# Patient Record
Sex: FEMALE | Race: WHITE | NOT HISPANIC OR LATINO | Employment: OTHER | URBAN - METROPOLITAN AREA
[De-identification: names, ages, dates, MRNs, and addresses within clinical notes are randomized per-mention and may not be internally consistent; named-entity substitution may affect disease eponyms.]

---

## 2017-04-04 ENCOUNTER — RECORDS - HEALTHEAST (OUTPATIENT)
Dept: LAB | Facility: CLINIC | Age: 77
End: 2017-04-04

## 2017-04-04 LAB
CHOLEST SERPL-MCNC: 173 MG/DL
FASTING STATUS PATIENT QL REPORTED: ABNORMAL
HBA1C MFR BLD: 6.6 % (ref 4.2–6.1)
HDLC SERPL-MCNC: 30 MG/DL
LDLC SERPL CALC-MCNC: 93 MG/DL
TRIGL SERPL-MCNC: 250 MG/DL

## 2017-11-07 NOTE — TELEPHONE ENCOUNTER
Phone Call:    Who did you talk to? (or) Who did you call? Nela    Call Detail/Action: Per Nela - Right knee surgery was done at [facility]. Records are with [facility]. Has only had one surgery to right knee.     Will fax the JOHANNE to Nela to sign. Fax#[phone]

## 2017-11-07 NOTE — TELEPHONE ENCOUNTER
APPT INFO    Date /Time: 11/15/17- 1:30 PM    Reason for Appt: Patellar tendon rupture after R TKA done in 2007    Ref Provider/Clinic: Dr. Carlos Ferreira    Are there internal records? If yes, list: -[facility] - Op note left knee arthroplasty with Dr. Carlos Ferreira 6/7/10     Patient Contact (Y/N) & Call Details: Yes    Action: LM for patient to call back.   Where did she see Dr. Orlin Uribe for right knee surgery?     -Faxed [facility] for records.      OUTSIDE RECORDS CHECKLIST     CLINIC NAME COMMENTS REC (x) IMG (x)   [facility]   X X   [facility]  X N/A  Images from 2006

## 2017-11-08 NOTE — TELEPHONE ENCOUNTER
Received Imaging From: [facility]    Image Type (x): Disc:_____  Pacs:__X___      Exam Date/Name: XR Knee 9/20/11, 9/5/17 Comments:   E-mailed Rufina to verona

## 2017-11-08 NOTE — TELEPHONE ENCOUNTER
Records Received From: [facility]     Date/Exam/Location  (specify location if different)   Office Notes: 9/5/17   Radiology Reports: XR Right and Left Knee: 9/5/17  Rufina Bell Notified (Y/N):

## 2017-11-10 ENCOUNTER — RECORDS - HEALTHEAST (OUTPATIENT)
Dept: LAB | Facility: CLINIC | Age: 77
End: 2017-11-10

## 2017-11-13 DIAGNOSIS — M25.561 RIGHT KNEE PAIN: Primary | ICD-10-CM

## 2017-11-13 LAB — HBA1C MFR BLD: 7.3 % (ref 4.2–6.1)

## 2017-11-13 NOTE — TELEPHONE ENCOUNTER
Records Received From: [facility]     Date/Exam/Location  (specify location if different)   Office Notes: 2006   ED/Hosp Notes: Discharge 6/12/06    Radiology Reports: XR Left Knee: 6/5/06  XR Right Knee: 6/5/06, 6/8/06, 7/27/06   Rufina Bell Notified (Y/N): No    Operative Notes & Implant Records: 6/8/06 - Right Total Knee Arthroplasty (Dr. Orlin Uribe)

## 2017-11-15 ENCOUNTER — MEDICAL CORRESPONDENCE (OUTPATIENT)
Dept: HEALTH INFORMATION MANAGEMENT | Facility: CLINIC | Age: 77
End: 2017-11-15

## 2017-11-15 ENCOUNTER — OFFICE VISIT (OUTPATIENT)
Dept: ORTHOPEDICS | Facility: CLINIC | Age: 77
End: 2017-11-15

## 2017-11-15 ENCOUNTER — PRE VISIT (OUTPATIENT)
Dept: ORTHOPEDICS | Facility: CLINIC | Age: 77
End: 2017-11-15

## 2017-11-15 DIAGNOSIS — Z96.651 STATUS POST TOTAL RIGHT KNEE REPLACEMENT: Primary | ICD-10-CM

## 2017-11-15 DIAGNOSIS — Z96.651 STATUS POST TOTAL RIGHT KNEE REPLACEMENT: ICD-10-CM

## 2017-11-15 LAB
CRP SERPL-MCNC: 9.6 MG/L (ref 0–8)
ERYTHROCYTE [SEDIMENTATION RATE] IN BLOOD BY WESTERGREN METHOD: 51 MM/H (ref 0–30)
HBA1C MFR BLD: 7.2 % (ref 4.3–6)

## 2017-11-15 RX ORDER — CETIRIZINE HYDROCHLORIDE 10 MG/1
10 TABLET ORAL DAILY
COMMUNITY
End: 2023-08-27

## 2017-11-15 RX ORDER — LISINOPRIL 2.5 MG/1
2.5 TABLET ORAL
COMMUNITY
Start: 2015-08-19 | End: 2023-08-27

## 2017-11-15 RX ORDER — GLIPIZIDE 10 MG/1
10 TABLET ORAL
COMMUNITY
Start: 2015-08-19 | End: 2023-08-27

## 2017-11-15 RX ORDER — FLUTICASONE PROPIONATE 50 MCG
1 SPRAY, SUSPENSION (ML) NASAL 2 TIMES DAILY
COMMUNITY

## 2017-11-15 NOTE — MR AVS SNAPSHOT
After Visit Summary   11/15/2017    Nela Suazo    MRN: [id number]           Patient Information     Date Of Birth          1940        Visit Information        Provider Department      11/15/2017 1:30 PM Case Wilkinson MD [facility]        Today's Diagnoses     Status post total right knee replacement    -  1       Follow-ups after your visit        Your next 10 appointments already scheduled     Dec 20, 2017  5:45 PM CST   (Arrive by 5:30 PM)   RETURN KNEE with Case Wilkinson MD   [facility] ([facility])    [address] [place] [id number] [phone]              Who to contact     Please call your clinic at [phone] to:    Ask questions about your health    Make or cancel appointments    Discuss your medicines    Learn about your test results    Speak to your doctor   If you have compliments or concerns about an experience at your clinic, or if you wish to file a complaint, please contact [facility] Patient Relations at [phone] or email us at [email or web address]         Additional Information About Your Visit        MyChart Information     [employer] is an electronic gateway that provides easy, online access to your medical records. With [employer], you can request a clinic appointment, read your test results, renew a prescription or communicate with your care team.     To sign up for [employer] visit the website at [email or web address]   You will be asked to enter the access code listed below, as well as some personal information. Please follow the directions to create your username and password.     Your access code is: [email or web address]  Expires: 2018  5:30 AM     Your access code will  in 90 days. If you need help or a new code, please contact your [facility] or call [phone] for assistance.         Care EveryWhere ID     This is your Care EveryWhere ID. This could be used by other organizations to access your [facility] medical records  [id number]         Blood Pressure from Last 3 Encounters:   11/08/14 123/58   11/22/10 132/84    Weight from Last 3 Encounters:   11/07/14 125.4 kg (276 lb 7.3 oz)   11/22/10 112.9 kg (249 lb)               Primary Care Provider Office Phone # Fax #    Horacio Lazaro [phone]       [facility] [address]  [facility] [address]        Equal Access to Services     JONNIE [facility] : Hadii aad ku hadasho Soomaali, waaxda luqadaha, qaybta kaalmada adeegyada, eugenia bernabe haykevinn adedi jean baptiste . So [facility] [phone].    ATENCIÓN: Si habla español, tiene a lee disposición servicios gratuitos de asistencia lingüística. Llame al [phone].    We comply with applicable federal civil rights laws and [place] laws. We do not discriminate on the basis of race, color, national origin, age, disability, sex, sexual orientation, or gender identity.            Thank you!     Thank you for choosing [facility]  for your care. Our goal is always to provide you with excellent care. Hearing back from our patients is one way we can continue to improve our services. Please take a few minutes to complete the written survey that you may receive in the mail after your visit with us. Thank you!             Your Updated Medication List - Protect others around you: Learn how to safely use, store and throw away your medicines at www.disposemymeds.org.          This list is accurate as of: 11/15/17  4:37 PM.  Always use your most recent med list.                   Brand Name Dispense Instructions for use Diagnosis    ABILIFY 30 MG tablet   Generic drug:  ARIPiprazole      Take 30 mg by mouth daily.        ACETAMINOPHEN PO      Take 1,000 mg by mouth 3 times daily        albuterol (2.5 MG/3ML) 0.083% neb solution      Take 1 ampule by  nebulization every 4 hours as needed        alum & mag hydroxide-simethicone 200-200-20 MG/5ML Susp suspension    MYLANTA/MAALOX     Take 30 mLs by mouth every 4 hours as needed (gastric distress)        AMOXICILLIN PO      Take 2,000 g by mouth as needed. 2000 Grams before dental procedures        ASPIRIN EC LO-DOSE 81 MG EC tablet   Generic drug:  aspirin      Take 81 mg by mouth every other day        Calcium + D3 600-200 MG-UNIT Tabs      Take 1 tablet by mouth 2 times daily        capsaicin 0.025 % Crea cream    ZOSTRIX     Apply 1 applicator topically 4 times daily Apply to left shoulder and lower back/spine.        cetirizine 10 MG tablet    zyrTEC     Take 10 mg by mouth daily        * divalproex 250 MG 24 hr tablet    DEPAKOTE ER     Take 250 mg by mouth At Bedtime Give in addition to 1000mg dose at bedtime. Total daily = 1250mg.        * divalproex 500 MG 24 hr tablet    DEPAKOTE ER     Take 1,000 mg by mouth At Bedtime Give with 250mg at bedtime        fexofenadine 60 MG tablet    ALLEGRA     Take 60 mg by mouth daily        FLONASE ALLERGY RELIEF 50 MCG/ACT spray   Generic drug:  fluticasone      Spray 1 spray into both nostrils daily        glipiZIDE 10 MG tablet    GLUCOTROL     Take 10 mg by mouth        guaiFENesin 100 MG/5ML Syrp    ROBITUSSIN     Take 5 mLs by mouth every 4 hours as needed for cough        hydrochlorothiazide 12.5 MG Tabs tablet      Take 12.5 mg by mouth daily        IMODIUM A-D 2 MG tablet   Generic drug:  loperamide      Take 2 mg by mouth 4 times daily as needed (up to 8mg/24 hours)        INDERAL 60 MG Tabs   Generic drug:  propranolol HCl      Take 60 mg by mouth 3 times daily        ipratropium 0.02 % neb solution    ATROVENT     Take 0.5 mg by nebulization every 4 hours as needed (Obstructive chronic airway)        lisinopril 2.5 MG tablet    PRINIVIL/Zestril     Take 2.5 mg by mouth        MILK OF MAGNESIA PO           nystatin 054912 UNIT/GM Powd    MYCOSTATIN     Apply  topically 2 times daily 1 application twice dailt to reddened areas of skin for rash.        omeprazole 20 MG tablet      Take 40 mg by mouth daily        order for DME      2 L.        OYST-MAIA 1250 MG tablet   Generic drug:  calcium carbonate      Take 2 tablets by mouth 3 times daily After meal        PENLAC EX           * polyvinyl alcohol 1.4 % ophthalmic solution    LIQUIFILM TEARS     1 drop 4 times daily        * polyvinyl alcohol 1.4 % ophthalmic solution    LIQUIFILM TEARS     Place 1 drop into both eyes every 2 hours as needed (May keep in room ans self administer)        potassium chloride 20 MEQ Packet    KLOR-CON     Take 20 mEq by mouth daily        ROBITUSSIN DM PO      Take  by mouth every 4 hours as needed.        sennosides 8.6 MG tablet    SENOKOT     Take 3 tablets by mouth At Bedtime        SIMETHICONE-80 PO      Take 80 mg by mouth 2 times daily After lunch and supper        SYMBICORT 80-4.5 MCG/ACT Inhaler   Generic drug:  budesonide-formoterol      Inhale 2 puffs into the lungs 2 times daily.        TUMS PO      Take 1 chew tab by mouth 4 times daily as needed        vitamin D3 1000 UNITS Caps      Take 1,000 Units by mouth        ZOCOR 20 MG tablet   Generic drug:  simvastatin      Take 20 mg by mouth At Bedtime.        ZOLOFT 100 MG tablet   Generic drug:  sertraline      Take 250 mg by mouth daily        * Notice:  This list has 4 medication(s) that are the same as other medications prescribed for you. Read the directions carefully, and ask your doctor or other care provider to review them with you.

## 2017-11-15 NOTE — LETTER
11/15/2017       RE: Nela Suazo  [id number]   [facility] [address]     Dear Colleague,    Thank you for referring your patient, Nela Suazo, to the [facility] at [facility]. Please see a copy of my visit note below.    [facility], Orthopaedic Surgery, Arthritis, Hip and Knee Replacement    Nela Suazo MRN# [id number]   Age: 77 year old YOB: 1940     Requesting physician: Carlos Ferreira              History of Present Illness:   Nela Suazo is a 77 year old year old female who presents today for evaluation and management of right knee extensor mechanism injury.    Nela is a very pleasant 77-year-old woman who had a right total knee replacement done about 11 years ago. Her postoperative course was complicated by an extensor mechanism rupture. She had subsequently seen a few orthopedic surgeons over the past few years who recommended referral to the [facility] or [facility] for further management. She never sought those appointments up until now. She has been very frustrated over the past year or so due to inability to walk and fully extend her knee.    She has been living in a rehabilitation center and has not been putting any weight on the leg. She had previously tried a knee brace for walking, however she is greatly bothered by the knee brace. She has normal extensor leg and every time she tries to walk, the knee asha.  She has no history of infection or problems with incisional healing.    Lost 33 pounds over the past 2 years through dietary management.           Past Medical History:     Patient Active Problem List   Diagnosis     Sepsis (H)     Past Medical History:   Diagnosis Date     Anemia      Bipolar 1 disorder (H)      Constipation      COPD (chronic obstructive pulmonary disease) (H)      Cough      Depressive disorder      Hyperlipidaemia hypopotass     Hypertension       Hypopotassemia      Insomnia      MRSA infection      Osteoporosis      Overweight      Polyneuritis      PVD (peripheral vascular disease) (H)      Reflux      Schizo affective schizophrenia (H)      Sleep apnea      Tremor      Uncomplicated asthma        Bipolar - recently saw psychiatrist who said she is optimized and acceptable risk for surgery from psych standpoint.    Prior history of blood clot: none  Prior history of bleeding problems: none  Prior history of anesthetic complications: none  Currently on opioids:  none  History of Diabetes: no           Past Surgical History:   No past surgical history on file.         Social History:     Social History     Social History     Marital status: Single     Spouse name: N/A     Number of children: N/A     Years of education: N/A     Occupational History     Not on file.     Social History Main Topics     Smoking status: Never Smoker     Smokeless tobacco: Not on file     Alcohol use Not on file     Drug use: Not on file     Sexual activity: Not on file     Other Topics Concern     Not on file     Social History Narrative       She lives in [facility]   Non smoker           Family History:     No family history on file.    No family hx of blood clots.         Medications:     Current Outpatient Prescriptions   Medication Sig     Cholecalciferol (VITAMIN D3) 1000 UNITS CAPS Take 1,000 Units by mouth     glipiZIDE (GLUCOTROL) 10 MG tablet Take 10 mg by mouth     lisinopril (PRINIVIL/ZESTRIL) 2.5 MG tablet Take 2.5 mg by mouth     Ciclopirox (PENLAC EX)      cetirizine (ZYRTEC) 10 MG tablet Take 10 mg by mouth daily     Magnesium Hydroxide (MILK OF MAGNESIA PO)      fluticasone (FLONASE ALLERGY RELIEF) 50 MCG/ACT spray Spray 1 spray into both nostrils daily     divalproex (DEPAKOTE ER) 250 MG 24 hr tablet Take 250 mg by mouth At Bedtime Give in addition to 1000mg dose at bedtime. Total daily = 1250mg.     polyvinyl alcohol (LIQUIFILM TEARS) 1.4 % ophthalmic solution 1  drop 4 times daily     polyvinyl alcohol (LIQUIFILM TEARS) 1.4 % ophthalmic solution Place 1 drop into both eyes every 2 hours as needed (May keep in room ans self administer)     nystatin (MYCOSTATIN) 252028 UNIT/GM POWD Apply topically 2 times daily 1 application twice dailt to reddened areas of skin for rash.     sennosides (SENOKOT) 8.6 MG tablet Take 3 tablets by mouth At Bedtime     SIMETHICONE-80 PO Take 80 mg by mouth 2 times daily After lunch and supper     ACETAMINOPHEN PO Take 1,000 mg by mouth 3 times daily     divalproex (DEPAKOTE ER) 500 MG 24 hr tablet Take 1,000 mg by mouth At Bedtime Give with 250mg at bedtime     ARIPiprazole (ABILIFY) 30 MG tablet Take 30 mg by mouth daily.     hydrochlorothiazide 12.5 MG TABS Take 12.5 mg by mouth daily      Propranolol HCl (INDERAL) 60 MG TABS Take 60 mg by mouth 3 times daily      budesonide-formoterol (SYMBICORT) 80-4.5 MCG/ACT inhaler Inhale 2 puffs into the lungs 2 times daily.     simvastatin (ZOCOR) 20 MG tablet Take 20 mg by mouth At Bedtime.     sertraline (ZOLOFT) 100 MG tablet Take 250 mg by mouth daily      aspirin (ASPIRIN EC LO-DOSE) 81 MG EC tablet Take 81 mg by mouth every other day      Omeprazole 20 MG tablet Take 40 mg by mouth daily      Calcium Carb-Cholecalciferol (CALCIUM + D3) 600-200 MG-UNIT TABS Take 1 tablet by mouth 2 times daily     capsaicin (ZOSTRIX) 0.025 % CREA Apply 1 applicator topically 4 times daily Apply to left shoulder and lower back/spine.     fexofenadine (ALLEGRA) 60 MG tablet Take 60 mg by mouth daily     alum & mag hydroxide-simethicone (MYLANTA/MAALOX) 200-200-20 MG/5ML SUSP Take 30 mLs by mouth every 4 hours as needed (gastric distress)     guaiFENesin (ROBITUSSIN) 100 MG/5ML SYRP Take 5 mLs by mouth every 4 hours as needed for cough     ipratropium (ATROVENT) 0.02 % nebulizer solution Take 0.5 mg by nebulization every 4 hours as needed (Obstructive chronic airway)     albuterol (2.5 MG/3ML) 0.083% nebulizer solution  Take 1 ampule by nebulization every 4 hours as needed      AMOXICILLIN PO Take 2,000 g by mouth as needed. 2000 Grams before dental procedures     calcium carbonate (OYST-MAIA) 500 MG tablet Take 2 tablets by mouth 3 times daily After meal     potassium chloride (KLOR-CON) 20 MEQ packet Take 20 mEq by mouth daily      Dextromethorphan-Guaifenesin (ROBITUSSIN DM PO) Take  by mouth every 4 hours as needed.     ORDER FOR DME 2 L.     loperamide (IMODIUM A-D) 2 MG tablet Take 2 mg by mouth 4 times daily as needed (up to 8mg/24 hours)      Calcium Carbonate Antacid (TUMS PO) Take 1 chew tab by mouth 4 times daily as needed      No current facility-administered medications for this visit.             Review of Systems:   A comprehensive 10 point review of systems (constitutional, ENT, cardiac, peripheral vascular, lymphatic, respiratory, GI, , Musculoskeletal, skin, Neurological) was performed and found to be negative except as described in this note.     Also see intake form completed by patient.             Physical Exam:     EXAMINATION pertinent findings:   VITAL SIGNS: There were no vitals taken for this visit.  There is no height or weight on file to calculate BMI.  GEN: AOx3, cooperative, no distress  RESP: non labored breathing   ABD: benign   SKIN: grossly normal   LYMPHATIC: grossly normal   NEURO: grossly normal   VASCULAR: satisfactory perfusion of all extremities  MUSCULOSKELETAL:   She has a well-healed midline incision. Her passive range of motion is from 0 to about 115 . She has mild lateral instability, but with a firm endpoint. She has a 40  extensor leg. Again, passively she has full extension. Ankle plantarflexion and dorsiflexion is intact. She has no numbness or tingling in her foot.             Data:   Imaging:   Plain films of the right knee reviewed which demonstrate a cemented cruciate retaining total knee replacement. The patella is proximal consistent with her patellar tendon rupture.          Assessment and Plan:   Assessment:  Nela is a very pleasant 77-year-old woman with a chronic patellar tendon rupture.  Discussion with her regarding surgical and nonsurgical treatment options. We discussed that any operative treatment at this point would be associated with significant risks including the risk of infection, rerupture, persistent pain, persistent difficulty with knee buckling. We discussed that the recovery process is also extremely difficult and would be more difficult given the weakness she has our left lower extremity and inability to walk even with a walker. At this point, I recommended she consider the discussion we had today. We'll obtain inflammatory markers today. We'll also obtain hemoglobin A1c. I'll see her back in clinic in 4-6 weeks and we will again revisit the procedure as well as other treatment options. She is agreement with this treatment plan and I will see her in a few weeks.    ADDENDUM: Inflammatory markers were elevated so will plan to obtain aspiration at her next visit.      Case Wilknison M.D.     Arthritis and Joint Replacement  Department of Orthopaedic Surgery, [facility]  [email or web address]  [phone] (pager)

## 2017-11-15 NOTE — PROGRESS NOTES
[facility], Orthopaedic Surgery, Arthritis, Hip and Knee Replacement    Nela Suazo MRN# [id number]   Age: 77 year old YOB: 1940     Requesting physician: Carlos Ferreira              History of Present Illness:   Nela Suazo is a 77 year old year old female who presents today for evaluation and management of right knee extensor mechanism injury.    Nela is a very pleasant 77-year-old woman who had a right total knee replacement done about 11 years ago. Her postoperative course was complicated by an extensor mechanism rupture. She had subsequently seen a few orthopedic surgeons over the past few years who recommended referral to the [place] or [place] for further management. She never sought those appointments up until now. She has been very frustrated over the past year or so due to inability to walk and fully extend her knee.    She has been living in a rehabilitation center and has not been putting any weight on the leg. She had previously tried a knee brace for walking, however she is greatly bothered by the knee brace. She has normal extensor leg and every time she tries to walk, the knee asha.  She has no history of infection or problems with incisional healing.    Lost 33 pounds over the past 2 years through dietary management.           Past Medical History:     Patient Active Problem List   Diagnosis     Sepsis (H)     Past Medical History:   Diagnosis Date     Anemia      Bipolar 1 disorder (H)      Constipation      COPD (chronic obstructive pulmonary disease) (H)      Cough      Depressive disorder      Hyperlipidaemia hypopotass     Hypertension      Hypopotassemia      Insomnia      MRSA infection      Osteoporosis      Overweight      Polyneuritis      PVD (peripheral vascular disease) (H)      Reflux      Schizo affective schizophrenia (H)      Sleep apnea      Tremor      Uncomplicated asthma        Bipolar - recently saw psychiatrist who said she is  optimized and acceptable risk for surgery from psych standpoint.    Prior history of blood clot: none  Prior history of bleeding problems: none  Prior history of anesthetic complications: none  Currently on opioids:  none  History of Diabetes: no           Past Surgical History:   No past surgical history on file.         Social History:     Social History     Social History     Marital status: Single     Spouse name: N/A     Number of children: N/A     Years of education: N/A     Occupational History     Not on file.     Social History Main Topics     Smoking status: Never Smoker     Smokeless tobacco: Not on file     Alcohol use Not on file     Drug use: Not on file     Sexual activity: Not on file     Other Topics Concern     Not on file     Social History Narrative       She lives in [facility]   Non smoker           Family History:     No family history on file.    No family hx of blood clots.         Medications:     Current Outpatient Prescriptions   Medication Sig     Cholecalciferol (VITAMIN D3) 1000 UNITS CAPS Take 1,000 Units by mouth     glipiZIDE (GLUCOTROL) 10 MG tablet Take 10 mg by mouth     lisinopril (PRINIVIL/ZESTRIL) 2.5 MG tablet Take 2.5 mg by mouth     Ciclopirox (PENLAC EX)      cetirizine (ZYRTEC) 10 MG tablet Take 10 mg by mouth daily     Magnesium Hydroxide (MILK OF MAGNESIA PO)      fluticasone (FLONASE ALLERGY RELIEF) 50 MCG/ACT spray Spray 1 spray into both nostrils daily     divalproex (DEPAKOTE ER) 250 MG 24 hr tablet Take 250 mg by mouth At Bedtime Give in addition to 1000mg dose at bedtime. Total daily = 1250mg.     polyvinyl alcohol (LIQUIFILM TEARS) 1.4 % ophthalmic solution 1 drop 4 times daily     polyvinyl alcohol (LIQUIFILM TEARS) 1.4 % ophthalmic solution Place 1 drop into both eyes every 2 hours as needed (May keep in room ans self administer)     nystatin (MYCOSTATIN) 684214 UNIT/GM POWD Apply topically 2 times daily 1 application twice dailt to reddened areas of skin for  rash.     sennosides (SENOKOT) 8.6 MG tablet Take 3 tablets by mouth At Bedtime     SIMETHICONE-80 PO Take 80 mg by mouth 2 times daily After lunch and supper     ACETAMINOPHEN PO Take 1,000 mg by mouth 3 times daily     divalproex (DEPAKOTE ER) 500 MG 24 hr tablet Take 1,000 mg by mouth At Bedtime Give with 250mg at bedtime     ARIPiprazole (ABILIFY) 30 MG tablet Take 30 mg by mouth daily.     hydrochlorothiazide 12.5 MG TABS Take 12.5 mg by mouth daily      Propranolol HCl (INDERAL) 60 MG TABS Take 60 mg by mouth 3 times daily      budesonide-formoterol (SYMBICORT) 80-4.5 MCG/ACT inhaler Inhale 2 puffs into the lungs 2 times daily.     simvastatin (ZOCOR) 20 MG tablet Take 20 mg by mouth At Bedtime.     sertraline (ZOLOFT) 100 MG tablet Take 250 mg by mouth daily      aspirin (ASPIRIN EC LO-DOSE) 81 MG EC tablet Take 81 mg by mouth every other day      Omeprazole 20 MG tablet Take 40 mg by mouth daily      Calcium Carb-Cholecalciferol (CALCIUM + D3) 600-200 MG-UNIT TABS Take 1 tablet by mouth 2 times daily     capsaicin (ZOSTRIX) 0.025 % CREA Apply 1 applicator topically 4 times daily Apply to left shoulder and lower back/spine.     fexofenadine (ALLEGRA) 60 MG tablet Take 60 mg by mouth daily     alum & mag hydroxide-simethicone (MYLANTA/MAALOX) 200-200-20 MG/5ML SUSP Take 30 mLs by mouth every 4 hours as needed (gastric distress)     guaiFENesin (ROBITUSSIN) 100 MG/5ML SYRP Take 5 mLs by mouth every 4 hours as needed for cough     ipratropium (ATROVENT) 0.02 % nebulizer solution Take 0.5 mg by nebulization every 4 hours as needed (Obstructive chronic airway)     albuterol (2.5 MG/3ML) 0.083% nebulizer solution Take 1 ampule by nebulization every 4 hours as needed      AMOXICILLIN PO Take 2,000 g by mouth as needed. 2000 Grams before dental procedures     calcium carbonate (OYST-MAIA) 500 MG tablet Take 2 tablets by mouth 3 times daily After meal     potassium chloride (KLOR-CON) 20 MEQ packet Take 20 mEq by mouth  daily      Dextromethorphan-Guaifenesin (ROBITUSSIN DM PO) Take  by mouth every 4 hours as needed.     ORDER FOR DME 2 L.     loperamide (IMODIUM A-D) 2 MG tablet Take 2 mg by mouth 4 times daily as needed (up to 8mg/24 hours)      Calcium Carbonate Antacid (TUMS PO) Take 1 chew tab by mouth 4 times daily as needed      No current facility-administered medications for this visit.             Review of Systems:   A comprehensive 10 point review of systems (constitutional, ENT, cardiac, peripheral vascular, lymphatic, respiratory, GI, , Musculoskeletal, skin, Neurological) was performed and found to be negative except as described in this note.     Also see intake form completed by patient.             Physical Exam:     EXAMINATION pertinent findings:   VITAL SIGNS: There were no vitals taken for this visit.  There is no height or weight on file to calculate BMI.  GEN: AOx3, cooperative, no distress  RESP: non labored breathing   ABD: benign   SKIN: grossly normal   LYMPHATIC: grossly normal   NEURO: grossly normal   VASCULAR: satisfactory perfusion of all extremities  MUSCULOSKELETAL:   She has a well-healed midline incision. Her passive range of motion is from 0 to about 115 . She has mild lateral instability, but with a firm endpoint. She has a 40  extensor leg. Again, passively she has full extension. Ankle plantarflexion and dorsiflexion is intact. She has no numbness or tingling in her foot.             Data:   Imaging:   Plain films of the right knee reviewed which demonstrate a cemented cruciate retaining total knee replacement. The patella is proximal consistent with her patellar tendon rupture.         Assessment and Plan:   Assessment:  Nela is a very pleasant 77-year-old woman with a chronic patellar tendon rupture.  Discussion with her regarding surgical and nonsurgical treatment options. We discussed that any operative treatment at this point would be associated with significant risks including the  risk of infection, rerupture, persistent pain, persistent difficulty with knee buckling. We discussed that the recovery process is also extremely difficult and would be more difficult given the weakness she has our left lower extremity and inability to walk even with a walker. At this point, I recommended she consider the discussion we had today. We'll obtain inflammatory markers today. We'll also obtain hemoglobin A1c. I'll see her back in clinic in 4-6 weeks and we will again revisit the procedure as well as other treatment options. She is agreement with this treatment plan and I will see her in a few weeks.    ADDENDUM: Inflammatory markers were elevated so will plan to obtain aspiration at her next visit.      Case Wilkinson M.D.     Arthritis and Joint Replacement  Department of Orthopaedic Surgery, [facility]  [email or web address]  [phone] (pager)           Review of Systems:

## 2017-11-15 NOTE — NURSING NOTE
Reason For Visit:   Chief Complaint   Patient presents with     Knee Pain     Patellar tendon rupture after total knee in 2007 by Dr. Jojo Dumont MD: Horacio Lazaro        Current Outpatient Prescriptions   Medication     Cholecalciferol (VITAMIN D3) 1000 UNITS CAPS     glipiZIDE (GLUCOTROL) 10 MG tablet     lisinopril (PRINIVIL/ZESTRIL) 2.5 MG tablet     Ciclopirox (PENLAC EX)     cetirizine (ZYRTEC) 10 MG tablet     Magnesium Hydroxide (MILK OF MAGNESIA PO)     fluticasone (FLONASE ALLERGY RELIEF) 50 MCG/ACT spray     divalproex (DEPAKOTE ER) 250 MG 24 hr tablet     polyvinyl alcohol (LIQUIFILM TEARS) 1.4 % ophthalmic solution     polyvinyl alcohol (LIQUIFILM TEARS) 1.4 % ophthalmic solution     nystatin (MYCOSTATIN) 453634 UNIT/GM POWD     sennosides (SENOKOT) 8.6 MG tablet     SIMETHICONE-80 PO     ACETAMINOPHEN PO     divalproex (DEPAKOTE ER) 500 MG 24 hr tablet     ARIPiprazole (ABILIFY) 30 MG tablet     hydrochlorothiazide 12.5 MG TABS     Propranolol HCl (INDERAL) 60 MG TABS     budesonide-formoterol (SYMBICORT) 80-4.5 MCG/ACT inhaler     simvastatin (ZOCOR) 20 MG tablet     sertraline (ZOLOFT) 100 MG tablet     aspirin (ASPIRIN EC LO-DOSE) 81 MG EC tablet     Omeprazole 20 MG tablet     Calcium Carb-Cholecalciferol (CALCIUM + D3) 600-200 MG-UNIT TABS     capsaicin (ZOSTRIX) 0.025 % CREA     fexofenadine (ALLEGRA) 60 MG tablet     alum & mag hydroxide-simethicone (MYLANTA/MAALOX) 200-200-20 MG/5ML SUSP     guaiFENesin (ROBITUSSIN) 100 MG/5ML SYRP     ipratropium (ATROVENT) 0.02 % nebulizer solution     albuterol (2.5 MG/3ML) 0.083% nebulizer solution     AMOXICILLIN PO     calcium carbonate (OYST-MAIA) 500 MG tablet     potassium chloride (KLOR-CON) 20 MEQ packet     Dextromethorphan-Guaifenesin (ROBITUSSIN DM PO)     ORDER FOR DME     loperamide (IMODIUM A-D) 2 MG tablet     Calcium Carbonate Antacid (TUMS PO)     No current facility-administered medications for this visit.           Allergies    Allergen Reactions     Ace Inhibitors      Codeine Sulfate      Influenza Vac Typ      Oxycodone            Joann Wagoner, JAMIEN

## 2017-12-20 ENCOUNTER — OFFICE VISIT (OUTPATIENT)
Dept: ORTHOPEDICS | Facility: CLINIC | Age: 77
End: 2017-12-20
Payer: COMMERCIAL

## 2017-12-20 DIAGNOSIS — Z96.651 STATUS POST TOTAL RIGHT KNEE REPLACEMENT: Primary | ICD-10-CM

## 2017-12-20 LAB
APPEARANCE FLD: NORMAL
BASOPHILS NFR FLD MANUAL: 1 %
COLOR FLD: NORMAL
LYMPHOCYTES NFR FLD MANUAL: 30 %
MONOS+MACROS NFR FLD MANUAL: 65 %
NEUTS BAND NFR FLD MANUAL: 4 %
RBC # FLD: NORMAL /UL
SPECIMEN SOURCE FLD: NORMAL
WBC # FLD AUTO: 123 /UL

## 2017-12-20 PROCEDURE — 87075 CULTR BACTERIA EXCEPT BLOOD: CPT | Performed by: ORTHOPAEDIC SURGERY

## 2017-12-20 PROCEDURE — 87070 CULTURE OTHR SPECIMN AEROBIC: CPT | Performed by: ORTHOPAEDIC SURGERY

## 2017-12-20 ASSESSMENT — ENCOUNTER SYMPTOMS
POOR WOUND HEALING: 0
SKIN CHANGES: 0
NAIL CHANGES: 1

## 2017-12-20 NOTE — MR AVS SNAPSHOT
After Visit Summary   12/20/2017    Nela Suazo    MRN: [id number]           Patient Information     Date Of Birth          1940        Visit Information        Provider Department      12/20/2017 5:45 PM Case Wilkinson MD [facility]        Today's Diagnoses     Status post total right knee replacement    -  1      Care Instructions    Nela Suazo MRN# [id number]   Age: 77 year old YOB: 1940      Requesting physician: Carlos Ferreira                History of Present Illness:   Nela Suazo is a 77 year old year old female who presents today for evaluation and management of right knee extensor mechanism injury.     At her last visit, she was referred for physical therapy.  She feels like she has made some progress with physical therapy.  She is now able to stand with the use of a walker and some support.  She was not able to do this prior to her last visit.  She noted that the physical therapist told her she was no longer a candidate for physical therapy given her extensor mechanism injury.  Nonetheless I do think she has made some progress with strength.  Otherwise her symptoms are more or less unchanged.  She gets around with the use of a motorized wheelchair due to the knee buckling when she tries to walk.             Past Medical History:          Patient Active Problem List   Diagnosis     Sepsis (H)       Past Medical History         Past Medical History:   Diagnosis Date     Anemia       Bipolar 1 disorder (H)       Constipation       COPD (chronic obstructive pulmonary disease) (H)       Cough       Depressive disorder       Hyperlipidaemia hypopotass     Hypertension       Hypopotassemia       Insomnia       MRSA infection       Osteoporosis       Overweight       Polyneuritis       PVD (peripheral vascular disease) (H)       Reflux       Schizo affective schizophrenia (H)       Sleep apnea       Tremor        Uncomplicated asthma              Bipolar - recently saw psychiatrist who said she is optimized and acceptable risk for surgery from psych standpoint.     Prior history of blood clot: none  Prior history of bleeding problems: none  Prior history of anesthetic complications: none  Currently on opioids:  none  History of Diabetes: no             Past Surgical History:    Past Surgical History    No past surgical history on file.             Social History:       Social History    Social History            Social History     Marital status: Single       Spouse name: N/A     Number of children: N/A     Years of education: N/A          Occupational History     Not on file.           Social History Main Topics     Smoking status: Never Smoker     Smokeless tobacco: Not on file     Alcohol use Not on file     Drug use: Not on file     Sexual activity: Not on file           Other Topics Concern     Not on file      Social History Narrative            She lives in [facility]   Non smoker             Family History:       Family History    No family history on file.        No family hx of blood clots.          Medications:           Current Outpatient Prescriptions   Medication Sig     Cholecalciferol (VITAMIN D3) 1000 UNITS CAPS Take 1,000 Units by mouth     glipiZIDE (GLUCOTROL) 10 MG tablet Take 10 mg by mouth     lisinopril (PRINIVIL/ZESTRIL) 2.5 MG tablet Take 2.5 mg by mouth     Ciclopirox (PENLAC EX)       cetirizine (ZYRTEC) 10 MG tablet Take 10 mg by mouth daily     Magnesium Hydroxide (MILK OF MAGNESIA PO)       fluticasone (FLONASE ALLERGY RELIEF) 50 MCG/ACT spray Spray 1 spray into both nostrils daily     capsaicin (ZOSTRIX) 0.025 % CREA Apply 1 applicator topically 4 times daily Apply to left shoulder and lower back/spine.     divalproex (DEPAKOTE ER) 250 MG 24 hr tablet Take 250 mg by mouth At Bedtime Give in addition to 1000mg dose at bedtime. Total daily = 1250mg.     alum & mag hydroxide-simethicone  (MYLANTA/MAALOX) 200-200-20 MG/5ML SUSP Take 30 mLs by mouth every 4 hours as needed (gastric distress)     guaiFENesin (ROBITUSSIN) 100 MG/5ML SYRP Take 5 mLs by mouth every 4 hours as needed for cough     ipratropium (ATROVENT) 0.02 % nebulizer solution Take 0.5 mg by nebulization every 4 hours as needed (Obstructive chronic airway)     polyvinyl alcohol (LIQUIFILM TEARS) 1.4 % ophthalmic solution 1 drop 4 times daily     polyvinyl alcohol (LIQUIFILM TEARS) 1.4 % ophthalmic solution Place 1 drop into both eyes every 2 hours as needed (May keep in room ans self administer)     nystatin (MYCOSTATIN) 403298 UNIT/GM POWD Apply topically 2 times daily 1 application twice dailt to reddened areas of skin for rash.     sennosides (SENOKOT) 8.6 MG tablet Take 3 tablets by mouth At Bedtime     SIMETHICONE-80 PO Take 80 mg by mouth 2 times daily After lunch and supper     ACETAMINOPHEN PO Take 1,000 mg by mouth 3 times daily     divalproex (DEPAKOTE ER) 500 MG 24 hr tablet Take 1,000 mg by mouth At Bedtime Give with 250mg at bedtime     ARIPiprazole (ABILIFY) 30 MG tablet Take 30 mg by mouth daily.     albuterol (2.5 MG/3ML) 0.083% nebulizer solution Take 1 ampule by nebulization every 4 hours as needed      AMOXICILLIN PO Take 2,000 g by mouth as needed. 2000 Grams before dental procedures     hydrochlorothiazide 12.5 MG TABS Take 12.5 mg by mouth daily      Propranolol HCl (INDERAL) 60 MG TABS Take 60 mg by mouth 3 times daily      budesonide-formoterol (SYMBICORT) 80-4.5 MCG/ACT inhaler Inhale 2 puffs into the lungs 2 times daily.     simvastatin (ZOCOR) 20 MG tablet Take 20 mg by mouth At Bedtime.     sertraline (ZOLOFT) 100 MG tablet Take 250 mg by mouth daily      ORDER FOR DME 2 L.     loperamide (IMODIUM A-D) 2 MG tablet Take 2 mg by mouth 4 times daily as needed (up to 8mg/24 hours)      Calcium Carbonate Antacid (TUMS PO) Take 1 chew tab by mouth 4 times daily as needed      aspirin (ASPIRIN EC LO-DOSE) 81 MG EC  tablet Take 81 mg by mouth every other day      Omeprazole 20 MG tablet Take 40 mg by mouth daily      Calcium Carb-Cholecalciferol (CALCIUM + D3) 600-200 MG-UNIT TABS Take 1 tablet by mouth 2 times daily     fexofenadine (ALLEGRA) 60 MG tablet Take 60 mg by mouth daily     calcium carbonate (OYST-MAIA) 500 MG tablet Take 2 tablets by mouth 3 times daily After meal     potassium chloride (KLOR-CON) 20 MEQ packet Take 20 mEq by mouth daily      Dextromethorphan-Guaifenesin (ROBITUSSIN DM PO) Take  by mouth every 4 hours as needed.      No current facility-administered medications for this visit.                     Physical Exam:      EXAMINATION pertinent findings:   VITAL SIGNS: There were no vitals taken for this visit.  There is no height or weight on file to calculate BMI.  GEN: AOx3, cooperative, no distress  RESP: non labored breathing   ABD: benign   SKIN: grossly normal   LYMPHATIC: grossly normal   NEURO: grossly normal   VASCULAR: satisfactory perfusion of all extremities  MUSCULOSKELETAL:   She has a well-healed midline incision. Her passive range of motion is from 0 to about 115 . She has mild lateral instability, but with a firm endpoint. She has a 40  extensor leg. Again, passively she has full extension. Ankle plantarflexion and dorsiflexion is intact. She has no numbness or tingling in her foot.                Data:   Imaging:   Plain films of the right knee reviewed which demonstrate a cemented cruciate retaining total knee replacement. The patella is proximal consistent with her patellar tendon rupture.          Assessment and Plan:   Assessment:  Nela is a very pleasant 77-year-old woman with a chronic patellar tendon rupture.     I again reviewed treatment options extensively with Nela.  Given her prior elevated ESR and CRP we discussed the risks and benefits of aspiration to evaluate for the possibility of a chronic low-grade infection.  She is in agreement with proceeding with this.     With  regards to longer-term management.  I recommended she continue to work with physical therapy for gait training with use of a walker.  I discussed that there is no reason that she should not be able to do physical therapy and begin to take a few steps even with the extensor mechanism injury.  She should certainly be able to support herself with her upper extremity and the use of a walker under close guidance with physical therapy.  She is a very high risk for postoperative complications should she consider surgery.  Therefore, I think we should optimize fully all nonsurgical treatment options and she is in agreement this.  We will see how things go over the next few weeks with physical therapy.  I will also contact her with the results of the knee aspiration was performed today.     PROCEDURE DATE: 12/20/2017     PROCEDURE NOTE:     After reviewing the risks and benefits of aspiration informed consent was obtained.  The right knee was prepped in the normal sterile fashion.  Topical anesthetic was used as necessary.  There were no complications and the patient tolerated the aspiration well.  10 ml of clear bloody fluid was obtained and sent for analysis.               Follow-ups after your visit        Additional Services     PHYSICAL THERAPY REFERRAL (External-Prints)       Physical Therapy Referral                  Your next 10 appointments already scheduled     Mar 14, 2018  3:30 PM CDT   (Arrive by 3:15 PM)   RETURN KNEE with Case Wilkinson MD   [facility] ([facility])    [address] [place] [id number] [phone]              Who to contact     Please call your clinic at [phone] to:    Ask questions about your health    Make or cancel appointments    Discuss your medicines    Learn about your test results    Speak to your doctor   If you have compliments or concerns about an experience at your clinic, or if you wish to file a  complaint, please contact [facility] Patient Relations at [phone] or email us at [email or web address]         Additional Information About Your Visit        [employer] Information     [email or web address] is an electronic gateway that provides easy, online access to your medical records. With [email or web address], you can request a clinic appointment, read your test results, renew a prescription or communicate with your care team.     To sign up for [email or web address] visit the website at [email or web address]   You will be asked to enter the access code listed below, as well as some personal information. Please follow the directions to create your username and password.     Your access code is: [email or web address]  Expires: 2018  5:30 AM     Your access code will  in 90 days. If you need help or a new code, please contact your [facility] or call [phone] for assistance.        Care EveryWhere ID     This is your Care EveryWhere ID. This could be used by other organizations to access your [facility] medical records  [id number]         Blood Pressure from Last 3 Encounters:   14 123/58   11/22/10 132/84    Weight from Last 3 Encounters:   14 125.4 kg (276 lb 7.3 oz)   11/22/10 112.9 kg (249 lb)              We Performed the Following     Anaerobic bacterial culture     Cell count with differential fluid     Fluid Culture Aerobic Bacterial     PHYSICAL THERAPY REFERRAL (External-Prints)     Synovasure Periprosthetic Joint Infection Detection        Primary Care Provider Office Phone # Fax #    Horacio Lazaro [phone]       [facility] [address] [facility] [address]        Equal Access to Services     RADHA GROSS : Ralf Lozano, derrek frost, eugenia conte. So [facility] [phone].    ATENCIÓN: Si habla español, tiene a lee disposición  servicios gratuitos de asistencia lingüística. Marquita pollard [phone].    We comply with applicable federal civil rights laws and [place] laws. We do not discriminate on the basis of race, color, national origin, age, disability, sex, sexual orientation, or gender identity.            Thank you!     Thank you for choosing [facility]  for your care. Our goal is always to provide you with excellent care. Hearing back from our patients is one way we can continue to improve our services. Please take a few minutes to complete the written survey that you may receive in the mail after your visit with us. Thank you!             Your Updated Medication List - Protect others around you: Learn how to safely use, store and throw away your medicines at www.disposemymeds.org.          This list is accurate as of: 12/20/17  6:38 PM.  Always use your most recent med list.                   Brand Name Dispense Instructions for use Diagnosis    ABILIFY 30 MG tablet   Generic drug:  ARIPiprazole      Take 30 mg by mouth daily.        ACETAMINOPHEN PO      Take 1,000 mg by mouth 3 times daily        albuterol (2.5 MG/3ML) 0.083% neb solution      Take 1 ampule by nebulization every 4 hours as needed        alum & mag hydroxide-simethicone 200-200-20 MG/5ML Susp suspension    MYLANTA/MAALOX     Take 30 mLs by mouth every 4 hours as needed (gastric distress)        AMOXICILLIN PO      Take 2,000 g by mouth as needed. 2000 Grams before dental procedures        ASPIRIN EC LO-DOSE 81 MG EC tablet   Generic drug:  aspirin      Take 81 mg by mouth every other day        Calcium + D3 600-200 MG-UNIT Tabs      Take 1 tablet by mouth 2 times daily        capsaicin 0.025 % Crea cream    ZOSTRIX     Apply 1 applicator topically 4 times daily Apply to left shoulder and lower back/spine.        cetirizine 10 MG tablet    zyrTEC     Take 10 mg by mouth daily        * divalproex 250 MG 24 hr tablet    DEPAKOTE ER     Take 250 mg by  mouth At Bedtime Give in addition to 1000mg dose at bedtime. Total daily = 1250mg.        * divalproex 500 MG 24 hr tablet    DEPAKOTE ER     Take 1,000 mg by mouth At Bedtime Give with 250mg at bedtime        fexofenadine 60 MG tablet    ALLEGRA     Take 60 mg by mouth daily        FLONASE ALLERGY RELIEF 50 MCG/ACT spray   Generic drug:  fluticasone      Spray 1 spray into both nostrils daily        glipiZIDE 10 MG tablet    GLUCOTROL     Take 10 mg by mouth        guaiFENesin 100 MG/5ML Syrp    ROBITUSSIN     Take 5 mLs by mouth every 4 hours as needed for cough        hydrochlorothiazide 12.5 MG Tabs tablet      Take 12.5 mg by mouth daily        IMODIUM A-D 2 MG tablet   Generic drug:  loperamide      Take 2 mg by mouth 4 times daily as needed (up to 8mg/24 hours)        INDERAL 60 MG Tabs   Generic drug:  propranolol HCl      Take 60 mg by mouth 3 times daily        ipratropium 0.02 % neb solution    ATROVENT     Take 0.5 mg by nebulization every 4 hours as needed (Obstructive chronic airway)        lisinopril 2.5 MG tablet    PRINIVIL/Zestril     Take 2.5 mg by mouth        MILK OF MAGNESIA PO           nystatin 881940 UNIT/GM Powd    MYCOSTATIN     Apply topically 2 times daily 1 application twice dailt to reddened areas of skin for rash.        omeprazole 20 MG tablet      Take 40 mg by mouth daily        order for DME      2 L.        OYST-MAIA 1250 MG tablet   Generic drug:  calcium carbonate      Take 2 tablets by mouth 3 times daily After meal        PENLAC EX           * polyvinyl alcohol 1.4 % ophthalmic solution    LIQUIFILM TEARS     1 drop 4 times daily        * polyvinyl alcohol 1.4 % ophthalmic solution    LIQUIFILM TEARS     Place 1 drop into both eyes every 2 hours as needed (May keep in room ans self administer)        potassium chloride 20 MEQ Packet    KLOR-CON     Take 20 mEq by mouth daily        ROBITUSSIN DM PO      Take  by mouth every 4 hours as needed.        sennosides 8.6 MG tablet     SENOKOT     Take 3 tablets by mouth At Bedtime        SIMETHICONE-80 PO      Take 80 mg by mouth 2 times daily After lunch and supper        SYMBICORT 80-4.5 MCG/ACT Inhaler   Generic drug:  budesonide-formoterol      Inhale 2 puffs into the lungs 2 times daily.        TUMS PO      Take 1 chew tab by mouth 4 times daily as needed        vitamin D3 1000 UNITS Caps      Take 1,000 Units by mouth        ZOCOR 20 MG tablet   Generic drug:  simvastatin      Take 20 mg by mouth At Bedtime.        ZOLOFT 100 MG tablet   Generic drug:  sertraline      Take 250 mg by mouth daily        * Notice:  This list has 4 medication(s) that are the same as other medications prescribed for you. Read the directions carefully, and ask your doctor or other care provider to review them with you.

## 2017-12-20 NOTE — LETTER
12/20/2017       RE: Nela Suazo  [address]   [facility] [address]     Dear Colleague,    Thank you for referring your patient, Nela Suazo, to the [facility] at [facility]. Please see a copy of my visit note below.    [facility], Orthopaedic Surgery, Arthritis, Hip and Knee Replacement    Nela Suazo MRN# [id number]   Age: 77 year old YOB: 1940     Requesting physician: Carlos Ferreira              History of Present Illness:   Nela Suazo is a 77 year old year old female who presents today for evaluation and management of right knee extensor mechanism injury.    At her last visit, she was referred for physical therapy.  She feels like she has made some progress with physical therapy.  She is now able to stand with the use of a walker and some support.  She was not able to do this prior to her last visit.  She noted that the physical therapist told her she was no longer a candidate for physical therapy given her extensor mechanism injury.  Nonetheless I do think she has made some progress with strength.  Otherwise her symptoms are more or less unchanged.  She gets around with the use of a motorized wheelchair due to the knee buckling when she tries to walk.           Past Medical History:     Patient Active Problem List   Diagnosis     Sepsis (H)     Past Medical History:   Diagnosis Date     Anemia      Bipolar 1 disorder (H)      Constipation      COPD (chronic obstructive pulmonary disease) (H)      Cough      Depressive disorder      Hyperlipidaemia hypopotass     Hypertension      Hypopotassemia      Insomnia      MRSA infection      Osteoporosis      Overweight      Polyneuritis      PVD (peripheral vascular disease) (H)      Reflux      Schizo affective schizophrenia (H)      Sleep apnea      Tremor      Uncomplicated asthma        Bipolar - recently saw psychiatrist who said  she is optimized and acceptable risk for surgery from psych standpoint.    Prior history of blood clot: none  Prior history of bleeding problems: none  Prior history of anesthetic complications: none  Currently on opioids:  none  History of Diabetes: no           Past Surgical History:   No past surgical history on file.         Social History:     Social History     Social History     Marital status: Single     Spouse name: N/A     Number of children: N/A     Years of education: N/A     Occupational History     Not on file.     Social History Main Topics     Smoking status: Never Smoker     Smokeless tobacco: Not on file     Alcohol use Not on file     Drug use: Not on file     Sexual activity: Not on file     Other Topics Concern     Not on file     Social History Narrative       She lives in [facility]   Non smoker           Family History:     No family history on file.    No family hx of blood clots.         Medications:     Current Outpatient Prescriptions   Medication Sig     Cholecalciferol (VITAMIN D3) 1000 UNITS CAPS Take 1,000 Units by mouth     glipiZIDE (GLUCOTROL) 10 MG tablet Take 10 mg by mouth     lisinopril (PRINIVIL/ZESTRIL) 2.5 MG tablet Take 2.5 mg by mouth     Ciclopirox (PENLAC EX)      cetirizine (ZYRTEC) 10 MG tablet Take 10 mg by mouth daily     Magnesium Hydroxide (MILK OF MAGNESIA PO)      fluticasone (FLONASE ALLERGY RELIEF) 50 MCG/ACT spray Spray 1 spray into both nostrils daily     capsaicin (ZOSTRIX) 0.025 % CREA Apply 1 applicator topically 4 times daily Apply to left shoulder and lower back/spine.     divalproex (DEPAKOTE ER) 250 MG 24 hr tablet Take 250 mg by mouth At Bedtime Give in addition to 1000mg dose at bedtime. Total daily = 1250mg.     alum & mag hydroxide-simethicone (MYLANTA/MAALOX) 200-200-20 MG/5ML SUSP Take 30 mLs by mouth every 4 hours as needed (gastric distress)     guaiFENesin (ROBITUSSIN) 100 MG/5ML SYRP Take 5 mLs by mouth every 4 hours as needed for cough      ipratropium (ATROVENT) 0.02 % nebulizer solution Take 0.5 mg by nebulization every 4 hours as needed (Obstructive chronic airway)     polyvinyl alcohol (LIQUIFILM TEARS) 1.4 % ophthalmic solution 1 drop 4 times daily     polyvinyl alcohol (LIQUIFILM TEARS) 1.4 % ophthalmic solution Place 1 drop into both eyes every 2 hours as needed (May keep in room ans self administer)     nystatin (MYCOSTATIN) 731500 UNIT/GM POWD Apply topically 2 times daily 1 application twice dailt to reddened areas of skin for rash.     sennosides (SENOKOT) 8.6 MG tablet Take 3 tablets by mouth At Bedtime     SIMETHICONE-80 PO Take 80 mg by mouth 2 times daily After lunch and supper     ACETAMINOPHEN PO Take 1,000 mg by mouth 3 times daily     divalproex (DEPAKOTE ER) 500 MG 24 hr tablet Take 1,000 mg by mouth At Bedtime Give with 250mg at bedtime     ARIPiprazole (ABILIFY) 30 MG tablet Take 30 mg by mouth daily.     albuterol (2.5 MG/3ML) 0.083% nebulizer solution Take 1 ampule by nebulization every 4 hours as needed      AMOXICILLIN PO Take 2,000 g by mouth as needed. 2000 Grams before dental procedures     hydrochlorothiazide 12.5 MG TABS Take 12.5 mg by mouth daily      Propranolol HCl (INDERAL) 60 MG TABS Take 60 mg by mouth 3 times daily      budesonide-formoterol (SYMBICORT) 80-4.5 MCG/ACT inhaler Inhale 2 puffs into the lungs 2 times daily.     simvastatin (ZOCOR) 20 MG tablet Take 20 mg by mouth At Bedtime.     sertraline (ZOLOFT) 100 MG tablet Take 250 mg by mouth daily      ORDER FOR DME 2 L.     loperamide (IMODIUM A-D) 2 MG tablet Take 2 mg by mouth 4 times daily as needed (up to 8mg/24 hours)      Calcium Carbonate Antacid (TUMS PO) Take 1 chew tab by mouth 4 times daily as needed      aspirin (ASPIRIN EC LO-DOSE) 81 MG EC tablet Take 81 mg by mouth every other day      Omeprazole 20 MG tablet Take 40 mg by mouth daily      Calcium Carb-Cholecalciferol (CALCIUM + D3) 600-200 MG-UNIT TABS Take 1 tablet by mouth 2 times daily      fexofenadine (ALLEGRA) 60 MG tablet Take 60 mg by mouth daily     calcium carbonate (OYST-MAIA) 500 MG tablet Take 2 tablets by mouth 3 times daily After meal     potassium chloride (KLOR-CON) 20 MEQ packet Take 20 mEq by mouth daily      Dextromethorphan-Guaifenesin (ROBITUSSIN DM PO) Take  by mouth every 4 hours as needed.     No current facility-administered medications for this visit.                 Physical Exam:     EXAMINATION pertinent findings:   VITAL SIGNS: There were no vitals taken for this visit.  There is no height or weight on file to calculate BMI.  GEN: AOx3, cooperative, no distress  RESP: non labored breathing   ABD: benign   SKIN: grossly normal   LYMPHATIC: grossly normal   NEURO: grossly normal   VASCULAR: satisfactory perfusion of all extremities  MUSCULOSKELETAL:   She has a well-healed midline incision. Her passive range of motion is from 0 to about 115 . She has mild lateral instability, but with a firm endpoint. She has a 40  extensor leg. Again, passively she has full extension. Ankle plantarflexion and dorsiflexion is intact. She has no numbness or tingling in her foot.             Data:   Imaging:   Plain films of the right knee reviewed which demonstrate a cemented cruciate retaining total knee replacement. The patella is proximal consistent with her patellar tendon rupture.         Assessment and Plan:   Assessment:  Nela is a very pleasant 77-year-old woman with a chronic patellar tendon rupture.    I again reviewed treatment options extensively with Nela.  Given her prior elevated ESR and CRP we discussed the risks and benefits of aspiration to evaluate for the possibility of a chronic low-grade infection.  She is in agreement with proceeding with this.    With regards to longer-term management.  I recommended she continue to work with physical therapy for gait training with use of a walker.  I discussed that there is no reason that she should not be able to do physical therapy  and begin to take a few steps even with the extensor mechanism injury.  She should certainly be able to support herself with her upper extremity and the use of a walker under close guidance with physical therapy.  She is a very high risk for postoperative complications should she consider surgery.  Therefore, I think we should optimize fully all nonsurgical treatment options and she is in agreement this.  We will see how things go over the next few weeks with physical therapy.  I will also contact her with the results of the knee aspiration was performed today.    PROCEDURE DATE: 12/20/2017    PROCEDURE NOTE:    After reviewing the risks and benefits of aspiration informed consent was obtained.  The right knee was prepped in the normal sterile fashion.  Topical anesthetic was used as necessary.  There were no complications and the patient tolerated the aspiration well.  10 ml of clear bloody fluid was obtained and sent for analysis.         Review of Systems:       Answers for HPI/ROS submitted by the patient on 12/20/2017   General Symptoms: No  Skin Symptoms: Yes  HENT Symptoms: Yes  EYE SYMPTOMS: Yes  HEART SYMPTOMS: No  LUNG SYMPTOMS: No  INTESTINAL SYMPTOMS: No  URINARY SYMPTOMS: Yes  GYNECOLOGIC SYMPTOMS: No  BREAST SYMPTOMS: No  SKELETAL SYMPTOMS: Yes  BLOOD SYMPTOMS: No  NERVOUS SYSTEM SYMPTOMS: No  MENTAL HEALTH SYMPTOMS: No  Changes in hair: No  Changes in moles/birth marks: No  Itching: No  Rashes: Yes  Changes in nails: Yes  Acne: No  Hair in places you don't want it: No  Change in facial hair: No  Warts: No  Non-healing sores: No  Scarring: No  Flaking of skin: No  Color changes of hands/feet in cold : No  Sun sensitivity: No    Case Wilkinson M.D.     Arthritis and Joint Replacement  Department of Orthopaedic Surgery, [facility]  [email or web address]  [phone] (pager)

## 2017-12-21 NOTE — PROGRESS NOTES
[facility], Orthopaedic Surgery, Arthritis, Hip and Knee Replacement    Nela Suazo MRN# [id number]   Age: 77 year old YOB: 1940     Requesting physician: Carlos Ferreira              History of Present Illness:   Nela Suazo is a 77 year old year old female who presents today for evaluation and management of right knee extensor mechanism injury.    At her last visit, she was referred for physical therapy.  She feels like she has made some progress with physical therapy.  She is now able to stand with the use of a walker and some support.  She was not able to do this prior to her last visit.  She noted that the physical therapist told her she was no longer a candidate for physical therapy given her extensor mechanism injury.  Nonetheless I do think she has made some progress with strength.  Otherwise her symptoms are more or less unchanged.  She gets around with the use of a motorized wheelchair due to the knee buckling when she tries to walk.           Past Medical History:     Patient Active Problem List   Diagnosis     Sepsis (H)     Past Medical History:   Diagnosis Date     Anemia      Bipolar 1 disorder (H)      Constipation      COPD (chronic obstructive pulmonary disease) (H)      Cough      Depressive disorder      Hyperlipidaemia hypopotass     Hypertension      Hypopotassemia      Insomnia      MRSA infection      Osteoporosis      Overweight      Polyneuritis      PVD (peripheral vascular disease) (H)      Reflux      Schizo affective schizophrenia (H)      Sleep apnea      Tremor      Uncomplicated asthma        Bipolar - recently saw psychiatrist who said she is optimized and acceptable risk for surgery from psych standpoint.    Prior history of blood clot: none  Prior history of bleeding problems: none  Prior history of anesthetic complications: none  Currently on opioids:  none  History of Diabetes: no           Past Surgical History:   No past surgical  history on file.         Social History:     Social History     Social History     Marital status: Single     Spouse name: N/A     Number of children: N/A     Years of education: N/A     Occupational History     Not on file.     Social History Main Topics     Smoking status: Never Smoker     Smokeless tobacco: Not on file     Alcohol use Not on file     Drug use: Not on file     Sexual activity: Not on file     Other Topics Concern     Not on file     Social History Narrative       She lives in [facility]   Non smoker           Family History:     No family history on file.    No family hx of blood clots.         Medications:     Current Outpatient Prescriptions   Medication Sig     Cholecalciferol (VITAMIN D3) 1000 UNITS CAPS Take 1,000 Units by mouth     glipiZIDE (GLUCOTROL) 10 MG tablet Take 10 mg by mouth     lisinopril (PRINIVIL/ZESTRIL) 2.5 MG tablet Take 2.5 mg by mouth     Ciclopirox (PENLAC EX)      cetirizine (ZYRTEC) 10 MG tablet Take 10 mg by mouth daily     Magnesium Hydroxide (MILK OF MAGNESIA PO)      fluticasone (FLONASE ALLERGY RELIEF) 50 MCG/ACT spray Spray 1 spray into both nostrils daily     capsaicin (ZOSTRIX) 0.025 % CREA Apply 1 applicator topically 4 times daily Apply to left shoulder and lower back/spine.     divalproex (DEPAKOTE ER) 250 MG 24 hr tablet Take 250 mg by mouth At Bedtime Give in addition to 1000mg dose at bedtime. Total daily = 1250mg.     alum & mag hydroxide-simethicone (MYLANTA/MAALOX) 200-200-20 MG/5ML SUSP Take 30 mLs by mouth every 4 hours as needed (gastric distress)     guaiFENesin (ROBITUSSIN) 100 MG/5ML SYRP Take 5 mLs by mouth every 4 hours as needed for cough     ipratropium (ATROVENT) 0.02 % nebulizer solution Take 0.5 mg by nebulization every 4 hours as needed (Obstructive chronic airway)     polyvinyl alcohol (LIQUIFILM TEARS) 1.4 % ophthalmic solution 1 drop 4 times daily     polyvinyl alcohol (LIQUIFILM TEARS) 1.4 % ophthalmic solution Place 1 drop into both  eyes every 2 hours as needed (May keep in room ans self administer)     nystatin (MYCOSTATIN) 528985 UNIT/GM POWD Apply topically 2 times daily 1 application twice dailt to reddened areas of skin for rash.     sennosides (SENOKOT) 8.6 MG tablet Take 3 tablets by mouth At Bedtime     SIMETHICONE-80 PO Take 80 mg by mouth 2 times daily After lunch and supper     ACETAMINOPHEN PO Take 1,000 mg by mouth 3 times daily     divalproex (DEPAKOTE ER) 500 MG 24 hr tablet Take 1,000 mg by mouth At Bedtime Give with 250mg at bedtime     ARIPiprazole (ABILIFY) 30 MG tablet Take 30 mg by mouth daily.     albuterol (2.5 MG/3ML) 0.083% nebulizer solution Take 1 ampule by nebulization every 4 hours as needed      AMOXICILLIN PO Take 2,000 g by mouth as needed. 2000 Grams before dental procedures     hydrochlorothiazide 12.5 MG TABS Take 12.5 mg by mouth daily      Propranolol HCl (INDERAL) 60 MG TABS Take 60 mg by mouth 3 times daily      budesonide-formoterol (SYMBICORT) 80-4.5 MCG/ACT inhaler Inhale 2 puffs into the lungs 2 times daily.     simvastatin (ZOCOR) 20 MG tablet Take 20 mg by mouth At Bedtime.     sertraline (ZOLOFT) 100 MG tablet Take 250 mg by mouth daily      ORDER FOR DME 2 L.     loperamide (IMODIUM A-D) 2 MG tablet Take 2 mg by mouth 4 times daily as needed (up to 8mg/24 hours)      Calcium Carbonate Antacid (TUMS PO) Take 1 chew tab by mouth 4 times daily as needed      aspirin (ASPIRIN EC LO-DOSE) 81 MG EC tablet Take 81 mg by mouth every other day      Omeprazole 20 MG tablet Take 40 mg by mouth daily      Calcium Carb-Cholecalciferol (CALCIUM + D3) 600-200 MG-UNIT TABS Take 1 tablet by mouth 2 times daily     fexofenadine (ALLEGRA) 60 MG tablet Take 60 mg by mouth daily     calcium carbonate (OYST-MAIA) 500 MG tablet Take 2 tablets by mouth 3 times daily After meal     potassium chloride (KLOR-CON) 20 MEQ packet Take 20 mEq by mouth daily      Dextromethorphan-Guaifenesin (ROBITUSSIN DM PO) Take  by mouth every  4 hours as needed.     No current facility-administered medications for this visit.                 Physical Exam:     EXAMINATION pertinent findings:   VITAL SIGNS: There were no vitals taken for this visit.  There is no height or weight on file to calculate BMI.  GEN: AOx3, cooperative, no distress  RESP: non labored breathing   ABD: benign   SKIN: grossly normal   LYMPHATIC: grossly normal   NEURO: grossly normal   VASCULAR: satisfactory perfusion of all extremities  MUSCULOSKELETAL:   She has a well-healed midline incision. Her passive range of motion is from 0 to about 115 . She has mild lateral instability, but with a firm endpoint. She has a 40  extensor leg. Again, passively she has full extension. Ankle plantarflexion and dorsiflexion is intact. She has no numbness or tingling in her foot.             Data:   Imaging:   Plain films of the right knee reviewed which demonstrate a cemented cruciate retaining total knee replacement. The patella is proximal consistent with her patellar tendon rupture.         Assessment and Plan:   Assessment:  Nela is a very pleasant 77-year-old woman with a chronic patellar tendon rupture.    I again reviewed treatment options extensively with Nela.  Given her prior elevated ESR and CRP we discussed the risks and benefits of aspiration to evaluate for the possibility of a chronic low-grade infection.  She is in agreement with proceeding with this.    With regards to longer-term management.  I recommended she continue to work with physical therapy for gait training with use of a walker.  I discussed that there is no reason that she should not be able to do physical therapy and begin to take a few steps even with the extensor mechanism injury.  She should certainly be able to support herself with her upper extremity and the use of a walker under close guidance with physical therapy.  She is a very high risk for postoperative complications should she consider surgery.   Therefore, I think we should optimize fully all nonsurgical treatment options and she is in agreement this.  We will see how things go over the next few weeks with physical therapy.  I will also contact her with the results of the knee aspiration was performed today.    PROCEDURE DATE: 12/20/2017    PROCEDURE NOTE:    After reviewing the risks and benefits of aspiration informed consent was obtained.  The right knee was prepped in the normal sterile fashion.  Topical anesthetic was used as necessary.  There were no complications and the patient tolerated the aspiration well.  10 ml of clear bloody fluid was obtained and sent for analysis.          Case Wilkinson M.D.     Arthritis and Joint Replacement  Department of Orthopaedic Surgery, [facility]  [email or web address]  [phone] (pager)           Review of Systems:       Answers for HPI/ROS submitted by the patient on 12/20/2017   General Symptoms: No  Skin Symptoms: Yes  HENT Symptoms: Yes  EYE SYMPTOMS: Yes  HEART SYMPTOMS: No  LUNG SYMPTOMS: No  INTESTINAL SYMPTOMS: No  URINARY SYMPTOMS: Yes  GYNECOLOGIC SYMPTOMS: No  BREAST SYMPTOMS: No  SKELETAL SYMPTOMS: Yes  BLOOD SYMPTOMS: No  NERVOUS SYSTEM SYMPTOMS: No  MENTAL HEALTH SYMPTOMS: No  Changes in hair: No  Changes in moles/birth marks: No  Itching: No  Rashes: Yes  Changes in nails: Yes  Acne: No  Hair in places you don't want it: No  Change in facial hair: No  Warts: No  Non-healing sores: No  Scarring: No  Flaking of skin: No  Color changes of hands/feet in cold : No  Sun sensitivity: No

## 2017-12-21 NOTE — NURSING NOTE
Reason For Visit:   Chief Complaint   Patient presents with     RECHECK     F/U 1 month. Patellar tendon rupture after Right TKA which was done in 2007       Primary MD: Horacio Lazaro      Current Outpatient Prescriptions   Medication     Cholecalciferol (VITAMIN D3) 1000 UNITS CAPS     glipiZIDE (GLUCOTROL) 10 MG tablet     lisinopril (PRINIVIL/ZESTRIL) 2.5 MG tablet     Ciclopirox (PENLAC EX)     cetirizine (ZYRTEC) 10 MG tablet     Magnesium Hydroxide (MILK OF MAGNESIA PO)     fluticasone (FLONASE ALLERGY RELIEF) 50 MCG/ACT spray     capsaicin (ZOSTRIX) 0.025 % CREA     divalproex (DEPAKOTE ER) 250 MG 24 hr tablet     alum & mag hydroxide-simethicone (MYLANTA/MAALOX) 200-200-20 MG/5ML SUSP     guaiFENesin (ROBITUSSIN) 100 MG/5ML SYRP     ipratropium (ATROVENT) 0.02 % nebulizer solution     polyvinyl alcohol (LIQUIFILM TEARS) 1.4 % ophthalmic solution     polyvinyl alcohol (LIQUIFILM TEARS) 1.4 % ophthalmic solution     nystatin (MYCOSTATIN) 968008 UNIT/GM POWD     sennosides (SENOKOT) 8.6 MG tablet     SIMETHICONE-80 PO     ACETAMINOPHEN PO     divalproex (DEPAKOTE ER) 500 MG 24 hr tablet     ARIPiprazole (ABILIFY) 30 MG tablet     albuterol (2.5 MG/3ML) 0.083% nebulizer solution     AMOXICILLIN PO     hydrochlorothiazide 12.5 MG TABS     Propranolol HCl (INDERAL) 60 MG TABS     budesonide-formoterol (SYMBICORT) 80-4.5 MCG/ACT inhaler     simvastatin (ZOCOR) 20 MG tablet     sertraline (ZOLOFT) 100 MG tablet     ORDER FOR DME     loperamide (IMODIUM A-D) 2 MG tablet     Calcium Carbonate Antacid (TUMS PO)     aspirin (ASPIRIN EC LO-DOSE) 81 MG EC tablet     Omeprazole 20 MG tablet     Calcium Carb-Cholecalciferol (CALCIUM + D3) 600-200 MG-UNIT TABS     fexofenadine (ALLEGRA) 60 MG tablet     calcium carbonate (OYST-MAIA) 500 MG tablet     potassium chloride (KLOR-CON) 20 MEQ packet     Dextromethorphan-Guaifenesin (ROBITUSSIN DM PO)     No current facility-administered medications for this visit.            Allergies   Allergen Reactions     Ace Inhibitors      Codeine Sulfate      Influenza Vac Typ      Oxycodone            Joann Wagoner LPN

## 2017-12-21 NOTE — PATIENT INSTRUCTIONS
Nela Suazo MRN# [id number]   Age: 77 year old YOB: 1940      Requesting physician: Carlos Ferreira                History of Present Illness:   Nela Suazo is a 77 year old year old female who presents today for evaluation and management of right knee extensor mechanism injury.     At her last visit, she was referred for physical therapy.  She feels like she has made some progress with physical therapy.  She is now able to stand with the use of a walker and some support.  She was not able to do this prior to her last visit.  She noted that the physical therapist told her she was no longer a candidate for physical therapy given her extensor mechanism injury.  Nonetheless I do think she has made some progress with strength.  Otherwise her symptoms are more or less unchanged.  She gets around with the use of a motorized wheelchair due to the knee buckling when she tries to walk.             Past Medical History:          Patient Active Problem List   Diagnosis     Sepsis (H)       Past Medical History         Past Medical History:   Diagnosis Date     Anemia       Bipolar 1 disorder (H)       Constipation       COPD (chronic obstructive pulmonary disease) (H)       Cough       Depressive disorder       Hyperlipidaemia hypopotass     Hypertension       Hypopotassemia       Insomnia       MRSA infection       Osteoporosis       Overweight       Polyneuritis       PVD (peripheral vascular disease) (H)       Reflux       Schizo affective schizophrenia (H)       Sleep apnea       Tremor       Uncomplicated asthma              Bipolar - recently saw psychiatrist who said she is optimized and acceptable risk for surgery from psych standpoint.     Prior history of blood clot: none  Prior history of bleeding problems: none  Prior history of anesthetic complications: none  Currently on opioids:  none  History of Diabetes: no             Past Surgical History:    Past Surgical History    No  past surgical history on file.             Social History:       Social History    Social History            Social History     Marital status: Single       Spouse name: N/A     Number of children: N/A     Years of education: N/A          Occupational History     Not on file.           Social History Main Topics     Smoking status: Never Smoker     Smokeless tobacco: Not on file     Alcohol use Not on file     Drug use: Not on file     Sexual activity: Not on file           Other Topics Concern     Not on file      Social History Narrative            She lives in [facility]   Non smoker             Family History:       Family History    No family history on file.        No family hx of blood clots.          Medications:           Current Outpatient Prescriptions   Medication Sig     Cholecalciferol (VITAMIN D3) 1000 UNITS CAPS Take 1,000 Units by mouth     glipiZIDE (GLUCOTROL) 10 MG tablet Take 10 mg by mouth     lisinopril (PRINIVIL/ZESTRIL) 2.5 MG tablet Take 2.5 mg by mouth     Ciclopirox (PENLAC EX)       cetirizine (ZYRTEC) 10 MG tablet Take 10 mg by mouth daily     Magnesium Hydroxide (MILK OF MAGNESIA PO)       fluticasone (FLONASE ALLERGY RELIEF) 50 MCG/ACT spray Spray 1 spray into both nostrils daily     capsaicin (ZOSTRIX) 0.025 % CREA Apply 1 applicator topically 4 times daily Apply to left shoulder and lower back/spine.     divalproex (DEPAKOTE ER) 250 MG 24 hr tablet Take 250 mg by mouth At Bedtime Give in addition to 1000mg dose at bedtime. Total daily = 1250mg.     alum & mag hydroxide-simethicone (MYLANTA/MAALOX) 200-200-20 MG/5ML SUSP Take 30 mLs by mouth every 4 hours as needed (gastric distress)     guaiFENesin (ROBITUSSIN) 100 MG/5ML SYRP Take 5 mLs by mouth every 4 hours as needed for cough     ipratropium (ATROVENT) 0.02 % nebulizer solution Take 0.5 mg by nebulization every 4 hours as needed (Obstructive chronic airway)     polyvinyl alcohol (LIQUIFILM TEARS) 1.4 % ophthalmic solution 1 drop  4 times daily     polyvinyl alcohol (LIQUIFILM TEARS) 1.4 % ophthalmic solution Place 1 drop into both eyes every 2 hours as needed (May keep in room ans self administer)     nystatin (MYCOSTATIN) 220495 UNIT/GM POWD Apply topically 2 times daily 1 application twice dailt to reddened areas of skin for rash.     sennosides (SENOKOT) 8.6 MG tablet Take 3 tablets by mouth At Bedtime     SIMETHICONE-80 PO Take 80 mg by mouth 2 times daily After lunch and supper     ACETAMINOPHEN PO Take 1,000 mg by mouth 3 times daily     divalproex (DEPAKOTE ER) 500 MG 24 hr tablet Take 1,000 mg by mouth At Bedtime Give with 250mg at bedtime     ARIPiprazole (ABILIFY) 30 MG tablet Take 30 mg by mouth daily.     albuterol (2.5 MG/3ML) 0.083% nebulizer solution Take 1 ampule by nebulization every 4 hours as needed      AMOXICILLIN PO Take 2,000 g by mouth as needed. 2000 Grams before dental procedures     hydrochlorothiazide 12.5 MG TABS Take 12.5 mg by mouth daily      Propranolol HCl (INDERAL) 60 MG TABS Take 60 mg by mouth 3 times daily      budesonide-formoterol (SYMBICORT) 80-4.5 MCG/ACT inhaler Inhale 2 puffs into the lungs 2 times daily.     simvastatin (ZOCOR) 20 MG tablet Take 20 mg by mouth At Bedtime.     sertraline (ZOLOFT) 100 MG tablet Take 250 mg by mouth daily      ORDER FOR DME 2 L.     loperamide (IMODIUM A-D) 2 MG tablet Take 2 mg by mouth 4 times daily as needed (up to 8mg/24 hours)      Calcium Carbonate Antacid (TUMS PO) Take 1 chew tab by mouth 4 times daily as needed      aspirin (ASPIRIN EC LO-DOSE) 81 MG EC tablet Take 81 mg by mouth every other day      Omeprazole 20 MG tablet Take 40 mg by mouth daily      Calcium Carb-Cholecalciferol (CALCIUM + D3) 600-200 MG-UNIT TABS Take 1 tablet by mouth 2 times daily     fexofenadine (ALLEGRA) 60 MG tablet Take 60 mg by mouth daily     calcium carbonate (OYST-MAIA) 500 MG tablet Take 2 tablets by mouth 3 times daily After meal     potassium chloride (KLOR-CON) 20 MEQ  packet Take 20 mEq by mouth daily      Dextromethorphan-Guaifenesin (ROBITUSSIN DM PO) Take  by mouth every 4 hours as needed.      No current facility-administered medications for this visit.                     Physical Exam:      EXAMINATION pertinent findings:   VITAL SIGNS: There were no vitals taken for this visit.  There is no height or weight on file to calculate BMI.  GEN: AOx3, cooperative, no distress  RESP: non labored breathing   ABD: benign   SKIN: grossly normal   LYMPHATIC: grossly normal   NEURO: grossly normal   VASCULAR: satisfactory perfusion of all extremities  MUSCULOSKELETAL:   She has a well-healed midline incision. Her passive range of motion is from 0 to about 115 . She has mild lateral instability, but with a firm endpoint. She has a 40  extensor leg. Again, passively she has full extension. Ankle plantarflexion and dorsiflexion is intact. She has no numbness or tingling in her foot.                Data:   Imaging:   Plain films of the right knee reviewed which demonstrate a cemented cruciate retaining total knee replacement. The patella is proximal consistent with her patellar tendon rupture.          Assessment and Plan:   Assessment:  Nela is a very pleasant 77-year-old woman with a chronic patellar tendon rupture.     I again reviewed treatment options extensively with Nela.  Given her prior elevated ESR and CRP we discussed the risks and benefits of aspiration to evaluate for the possibility of a chronic low-grade infection.  She is in agreement with proceeding with this.     With regards to longer-term management.  I recommended she continue to work with physical therapy for gait training with use of a walker.  I discussed that there is no reason that she should not be able to do physical therapy and begin to take a few steps even with the extensor mechanism injury.  She should certainly be able to support herself with her upper extremity and the use of a walker under close  guidance with physical therapy.  She is a very high risk for postoperative complications should she consider surgery.  Therefore, I think we should optimize fully all nonsurgical treatment options and she is in agreement this.  We will see how things go over the next few weeks with physical therapy.  I will also contact her with the results of the knee aspiration was performed today.     PROCEDURE DATE: 12/20/2017     PROCEDURE NOTE:     After reviewing the risks and benefits of aspiration informed consent was obtained.  The right knee was prepped in the normal sterile fashion.  Topical anesthetic was used as necessary.  There were no complications and the patient tolerated the aspiration well.  10 ml of clear bloody fluid was obtained and sent for analysis.

## 2017-12-21 NOTE — NURSING NOTE
[facility]  [address] [place] [id number]  Dept: [phone]  ______________________________________________________________________________    Patient: Nela Suazo   : 1940   MRN: [id number]   2017    INVASIVE PROCEDURE SAFETY CHECKLIST    Date: 2017   Procedure: Right knee aspiration  Patient Name: Nela Suazo  MRN: [id number]  YOB: 1940    Action: Complete sections as appropriate. Any discrepancy results in a HARD COPY until resolved.     PRE PROCEDURE:  Patient ID verified with 2 identifiers (name and  or MRN): Yes  Procedure and site verified with patient/designee (when able): Yes  Accurate consent documentation in medical record: Yes  H&P (or appropriate assessment) documented in medical record: Yes  H&P must be up to 20 days prior to procedure and updates within 24 hours of procedure as applicable: Yes  Relevant diagnostic and radiology test results appropriately labeled and displayed as applicable: Yes  Procedure site(s) marked with provider initials: Yes    TIMEOUT:  Time-Out performed immediately prior to starting procedure, including verbal and active participation of all team members addressing the following:Yes  * Correct patient identify  * Confirmed that the correct side and site are marked  * An accurate procedure consent form  * Agreement on the procedure to be done  * Correct patient position  * Relevant images and results are properly labeled and appropriately displayed  * The need to administer antibiotics or fluids for irrigation purposes during the procedure as applicable   * Safety precautions based on patient history or medication use    DURING PROCEDURE: Verification of correct person, site, and procedures any time the responsibility for care of the patient is transferred to another member of the care team.

## 2017-12-25 LAB
BACTERIA SPEC CULT: NO GROWTH
Lab: NORMAL
SPECIMEN SOURCE: NORMAL

## 2017-12-29 LAB — SYNOVASURE PERIPROSTHETIC JOINT INFECTION DECTION: NORMAL

## 2018-01-04 LAB
BACTERIA SPEC CULT: NORMAL
Lab: NORMAL
SPECIMEN SOURCE: NORMAL

## 2018-02-12 ENCOUNTER — RECORDS - HEALTHEAST (OUTPATIENT)
Dept: LAB | Facility: CLINIC | Age: 78
End: 2018-02-12

## 2018-02-12 LAB
FLUAV AG SPEC QL IA: NORMAL
FLUBV AG SPEC QL IA: NORMAL

## 2018-02-14 ENCOUNTER — RECORDS - HEALTHEAST (OUTPATIENT)
Dept: LAB | Facility: CLINIC | Age: 78
End: 2018-02-14

## 2018-02-15 LAB
ANION GAP SERPL CALCULATED.3IONS-SCNC: 8 MMOL/L (ref 5–18)
BUN SERPL-MCNC: 26 MG/DL (ref 8–28)
CALCIUM SERPL-MCNC: 9.3 MG/DL (ref 8.5–10.5)
CHLORIDE BLD-SCNC: 109 MMOL/L (ref 98–107)
CO2 SERPL-SCNC: 23 MMOL/L (ref 22–31)
CREAT SERPL-MCNC: 0.93 MG/DL (ref 0.6–1.1)
GFR SERPL CREATININE-BSD FRML MDRD: 58 ML/MIN/1.73M2
GLUCOSE BLD-MCNC: 128 MG/DL (ref 70–125)
POTASSIUM BLD-SCNC: 4.1 MMOL/L (ref 3.5–5)
SODIUM SERPL-SCNC: 140 MMOL/L (ref 136–145)

## 2018-03-14 ENCOUNTER — OFFICE VISIT (OUTPATIENT)
Dept: ORTHOPEDICS | Facility: CLINIC | Age: 78
End: 2018-03-14
Payer: COMMERCIAL

## 2018-03-14 DIAGNOSIS — Z96.651 STATUS POST TOTAL RIGHT KNEE REPLACEMENT: Primary | ICD-10-CM

## 2018-03-14 DIAGNOSIS — T14.8XXA TORN LIGAMENT: ICD-10-CM

## 2018-03-14 RX ORDER — ZINC OXIDE 20 %
OINTMENT (GRAM) TOPICAL PRN
COMMUNITY
End: 2023-08-27

## 2018-03-14 RX ORDER — IPRATROPIUM BROMIDE 21 UG/1
2 SPRAY, METERED NASAL 4 TIMES DAILY
COMMUNITY

## 2018-03-14 RX ORDER — POLYETHYLENE GLYCOL 3350 17 G/17G
17 POWDER, FOR SOLUTION ORAL DAILY
COMMUNITY

## 2018-03-14 NOTE — NURSING NOTE
Reason For Visit:   Chief Complaint   Patient presents with     RECHECK     F/U Patellar tendon rupture after a TKA.       Primary MD: Horacio Lazaro        Current Outpatient Prescriptions   Medication     zinc oxide 20 % ointment     polyethylene glycol (MIRALAX/GLYCOLAX) powder     Cholecalciferol (VITAMIN D3) 1000 UNITS CAPS     glipiZIDE (GLUCOTROL) 10 MG tablet     lisinopril (PRINIVIL/ZESTRIL) 2.5 MG tablet     Ciclopirox (PENLAC EX)     Magnesium Hydroxide (MILK OF MAGNESIA PO)     capsaicin (ZOSTRIX) 0.025 % CREA     divalproex (DEPAKOTE ER) 250 MG 24 hr tablet     guaiFENesin (ROBITUSSIN) 100 MG/5ML SYRP     polyvinyl alcohol (LIQUIFILM TEARS) 1.4 % ophthalmic solution     polyvinyl alcohol (LIQUIFILM TEARS) 1.4 % ophthalmic solution     nystatin (MYCOSTATIN) 147517 UNIT/GM POWD     sennosides (SENOKOT) 8.6 MG tablet     SIMETHICONE-80 PO     ACETAMINOPHEN PO     divalproex (DEPAKOTE ER) 500 MG 24 hr tablet     ARIPiprazole (ABILIFY) 30 MG tablet     AMOXICILLIN PO     Propranolol HCl (INDERAL) 60 MG TABS     budesonide-formoterol (SYMBICORT) 80-4.5 MCG/ACT inhaler     simvastatin (ZOCOR) 20 MG tablet     sertraline (ZOLOFT) 100 MG tablet     loperamide (IMODIUM A-D) 2 MG tablet     aspirin (ASPIRIN EC LO-DOSE) 81 MG EC tablet     Omeprazole 20 MG tablet     ipratropium (ATROVENT) 0.03 % spray     cetirizine (ZYRTEC) 10 MG tablet     fluticasone (FLONASE ALLERGY RELIEF) 50 MCG/ACT spray     Calcium Carb-Cholecalciferol (CALCIUM + D3) 600-200 MG-UNIT TABS     fexofenadine (ALLEGRA) 60 MG tablet     alum & mag hydroxide-simethicone (MYLANTA/MAALOX) 200-200-20 MG/5ML SUSP     ipratropium (ATROVENT) 0.02 % nebulizer solution     albuterol (2.5 MG/3ML) 0.083% nebulizer solution     calcium carbonate (OYST-MAIA) 500 MG tablet     hydrochlorothiazide 12.5 MG TABS     potassium chloride (KLOR-CON) 20 MEQ packet     Dextromethorphan-Guaifenesin (ROBITUSSIN DM PO)     ORDER FOR DME     Calcium Carbonate Antacid  (TUMS PO)     No current facility-administered medications for this visit.           Allergies   Allergen Reactions     Ace Inhibitors      Codeine Sulfate      Influenza Vac Typ      Oxycodone            Joann Wagoner LPN

## 2018-03-14 NOTE — MR AVS SNAPSHOT
After Visit Summary   3/14/2018    Nela Suazo    MRN: [id number]           Patient Information     Date Of Birth          1940        Visit Information        Provider Department      3/14/2018 3:30 PM Case Wilkinson MD [facility]        Today's Diagnoses     Status post total right knee replacement    -  1    Torn ligament           Follow-ups after your visit        Additional Services     ORTHOTICS REFERRAL (external)       **This referral order prints off in the [facility]  (Orthotics & Prosthetics) Central Scheduling Office**    The [facility] Central Scheduling Staff will contact the patient to schedule appointments.     Central Scheduling Contact Information: [phone] ([place])    Orthotics: Right Knee Brace  Hinged Knee brace to be locked when walking, OK to unlock when not walking    Please be aware that coverage of these services is subject to the terms and limitations of your health insurance plan.  Call member services at your health plan with any benefit or coverage questions.      Please bring the following to your appointment:    >>   Any x-rays, CTs or MRIs which have been performed.  Contact the facility where they were done to arrange for  prior to your scheduled appointment.    >>   List of current medications   >>   This referral request   >>   Any documents/labs given to you for this referral                  Who to contact     Please call your clinic at [phone] to:    Ask questions about your health    Make or cancel appointments    Discuss your medicines    Learn about your test results    Speak to your doctor            Additional Information About Your Visit        MyChart Information     eV[employer]ues is an electronic gateway that provides easy, online access to your medical records. With eVenues, you can request a clinic appointment, read your test results, renew a prescription or communicate with  your care team.     To sign up for [employer] visit the website at [email or web address]   You will be asked to enter the access code listed below, as well as some personal information. Please follow the directions to create your username and password.     Your access code is: [email or web address]  Expires: 2018  7:30 AM     Your access code will  in 90 days. If you need help or a new code, please contact your [facility] or call [phone] for assistance.        Care EveryWhere ID     This is your Care EveryWhere ID. This could be used by other organizations to access your [facility] medical records  [id number]         Blood Pressure from Last 3 Encounters:   14 123/58   11/22/10 132/84    Weight from Last 3 Encounters:   14 125.4 kg (276 lb 7.3 oz)   11/22/10 112.9 kg (249 lb)              We Performed the Following     ORTHOTICS REFERRAL (external)        Primary Care Provider Office Phone # Fax #    Horacio LazaroDO [phone]       [facility] [address]  [facility] [address]        Equal Access to Services     RADHA GROSS AH: Hadii darío elizalde hadasho Soomaali, waaxda luqadaha, qaybta kaalmada adeegyada, eugenia cardenas. So [facility] [phone].    ATENCIÓN: Si habla español, tiene a lee disposición servicios gratuitos de asistencia lingüística. Analyame al [phone].    We comply with applicable federal civil rights laws and [place] laws. We do not discriminate on the basis of race, color, national origin, age, disability, sex, sexual orientation, or gender identity.            Thank you!     Thank you for choosing [facility]  for your care. Our goal is always to provide you with excellent care. Hearing back from our patients is one way we can continue to improve our services. Please take a few minutes to complete the written survey that you may receive in the mail after your visit  with us. Thank you!             Your Updated Medication List - Protect others around you: Learn how to safely use, store and throw away your medicines at www.disposemymeds.org.          This list is accurate as of 3/14/18  4:27 PM.  Always use your most recent med list.                   Brand Name Dispense Instructions for use Diagnosis    ABILIFY 30 MG tablet   Generic drug:  ARIPiprazole      Take 30 mg by mouth daily.        ACETAMINOPHEN PO      Take 1,000 mg by mouth 3 times daily        albuterol (2.5 MG/3ML) 0.083% neb solution      Take 1 ampule by nebulization every 4 hours as needed        alum & mag hydroxide-simethicone 200-200-20 MG/5ML Susp suspension    MYLANTA/MAALOX     Take 30 mLs by mouth every 4 hours as needed (gastric distress)        AMOXICILLIN PO      Take 2,000 g by mouth as needed. 2000 Grams before dental procedures        ASPIRIN EC LO-DOSE 81 MG EC tablet   Generic drug:  aspirin      Take 81 mg by mouth every other day        ATROVENT 0.03 % spray   Generic drug:  ipratropium      Spray 2 sprays into both nostrils every 12 hours        Calcium + D3 600-200 MG-UNIT Tabs      Take 1 tablet by mouth 2 times daily        capsaicin 0.025 % Crea cream    ZOSTRIX     Apply 1 applicator topically 4 times daily Apply to left shoulder and lower back/spine.        cetirizine 10 MG tablet    zyrTEC     Take 10 mg by mouth daily        * divalproex sodium extended-release 250 MG 24 hr tablet    DEPAKOTE ER     Take 250 mg by mouth At Bedtime Give in addition to 1000mg dose at bedtime. Total daily = 1250mg.        * divalproex sodium extended-release 500 MG 24 hr tablet    DEPAKOTE ER     Take 1,000 mg by mouth At Bedtime Give with 250mg at bedtime        fexofenadine 60 MG tablet    ALLEGRA     Take 60 mg by mouth daily        FLONASE ALLERGY RELIEF 50 MCG/ACT spray   Generic drug:  fluticasone      Spray 1 spray into both nostrils daily        glipiZIDE 10 MG tablet    GLUCOTROL     Take 10 mg by  mouth        guaiFENesin 100 MG/5ML Syrp    ROBITUSSIN     Take 5 mLs by mouth every 4 hours as needed for cough        hydrochlorothiazide 12.5 MG Tabs tablet      Take 12.5 mg by mouth daily        IMODIUM A-D 2 MG tablet   Generic drug:  loperamide      Take 2 mg by mouth 4 times daily as needed (up to 8mg/24 hours)        INDERAL 60 MG Tabs   Generic drug:  propranolol HCl      Take 60 mg by mouth 3 times daily        ipratropium 0.02 % neb solution    ATROVENT     Take 0.5 mg by nebulization every 4 hours as needed (Obstructive chronic airway)        lisinopril 2.5 MG tablet    PRINIVIL/Zestril     Take 2.5 mg by mouth        MILK OF MAGNESIA PO           nystatin 809303 UNIT/GM Powd    MYCOSTATIN     Apply topically 2 times daily 1 application twice dailt to reddened areas of skin for rash.        omeprazole 20 MG tablet      Take 40 mg by mouth daily        order for DME      2 L.        OYST-MAIA 1250 MG tablet   Generic drug:  calcium carbonate      Take 2 tablets by mouth 3 times daily After meal        PENLAC EX           polyethylene glycol powder    MIRALAX/GLYCOLAX     Take 1 capful by mouth daily        * polyvinyl alcohol 1.4 % ophthalmic solution    LIQUIFILM TEARS     1 drop 4 times daily        * polyvinyl alcohol 1.4 % ophthalmic solution    LIQUIFILM TEARS     Place 1 drop into both eyes every 2 hours as needed (May keep in room ans self administer)        potassium chloride 20 MEQ Packet    KLOR-CON     Take 20 mEq by mouth daily        ROBITUSSIN DM PO      Take  by mouth every 4 hours as needed.        sennosides 8.6 MG tablet    SENOKOT     Take 3 tablets by mouth At Bedtime        SIMETHICONE-80 PO      Take 80 mg by mouth 2 times daily After lunch and supper        SYMBICORT 80-4.5 MCG/ACT Inhaler   Generic drug:  budesonide-formoterol      Inhale 2 puffs into the lungs 2 times daily.        TUMS PO      Take 1 chew tab by mouth 4 times daily as needed        vitamin D3 1000 UNITS Caps       Take 1,000 Units by mouth        zinc oxide 20 % ointment      Apply topically as needed for dry skin or irritation        ZOCOR 20 MG tablet   Generic drug:  simvastatin      Take 20 mg by mouth At Bedtime.        ZOLOFT 100 MG tablet   Generic drug:  sertraline      Take 250 mg by mouth daily        * Notice:  This list has 4 medication(s) that are the same as other medications prescribed for you. Read the directions carefully, and ask your doctor or other care provider to review them with you.

## 2018-03-14 NOTE — LETTER
3/14/2018       RE: Nela Suazo  [address]   [facility] [address]     Dear Colleague,    Thank you for referring your patient, Nela Suazo, to the [facility] at [facility]. Please see a copy of my visit note below.    [facility] Physicians, Orthopaedic Surgery, Arthritis, Hip and Knee Replacement    Nela Suazo MRN# [id number]   Age: 77 year old YOB: 1940     Requesting physician: Carlos Ferreira              History of Present Illness:   Nela Suazo is a 77 year old year old female who presents today for evaluation and management of right knee extensor mechanism injury.    Nela continues to make very good progress in physical therapy. She is now able to walk about 50 feet with the use of a walker. Previously she is only able to take a few steps. All she feels like she continues to make progress with his strengthening of her right lower extremity. She is also hoping to work on upper extremity strengthening to further improve her mobilization and potentially discharged from [facility]. The infectious workup obtained at the last visit was negative. She has no pain in the knee.           Past Medical History:     Patient Active Problem List   Diagnosis     Sepsis (H)     Past Medical History:   Diagnosis Date     Anemia      Bipolar 1 disorder (H)      Constipation      COPD (chronic obstructive pulmonary disease) (H)      Cough      Depressive disorder      Hyperlipidaemia hypopotass     Hypertension      Hypopotassemia      Insomnia      MRSA infection      Osteoporosis      Overweight      Polyneuritis      PVD (peripheral vascular disease) (H)      Reflux      Schizo affective schizophrenia (H)      Sleep apnea      Tremor      Uncomplicated asthma        Bipolar - recently saw psychiatrist who said she is optimized and acceptable risk for surgery from psych standpoint.    Prior history of blood  clot: none  Prior history of bleeding problems: none  Prior history of anesthetic complications: none  Currently on opioids:  none  History of Diabetes: no           Past Surgical History:   No past surgical history on file.         Social History:     Social History     Social History     Marital status: Single     Spouse name: N/A     Number of children: N/A     Years of education: N/A     Occupational History     Not on file.     Social History Main Topics     Smoking status: Never Smoker     Smokeless tobacco: Not on file     Alcohol use Not on file     Drug use: Not on file     Sexual activity: Not on file     Other Topics Concern     Not on file     Social History Narrative       She lives in [facility]   Non smoker           Family History:     No family history on file.    No family hx of blood clots.         Medications:     Current Outpatient Prescriptions   Medication Sig     zinc oxide 20 % ointment Apply topically as needed for dry skin or irritation     polyethylene glycol (MIRALAX/GLYCOLAX) powder Take 1 capful by mouth daily     Cholecalciferol (VITAMIN D3) 1000 UNITS CAPS Take 1,000 Units by mouth     glipiZIDE (GLUCOTROL) 10 MG tablet Take 10 mg by mouth     lisinopril (PRINIVIL/ZESTRIL) 2.5 MG tablet Take 2.5 mg by mouth     Ciclopirox (PENLAC EX)      Magnesium Hydroxide (MILK OF MAGNESIA PO)      capsaicin (ZOSTRIX) 0.025 % CREA Apply 1 applicator topically 4 times daily Apply to left shoulder and lower back/spine.     divalproex (DEPAKOTE ER) 250 MG 24 hr tablet Take 250 mg by mouth At Bedtime Give in addition to 1000mg dose at bedtime. Total daily = 1250mg.     guaiFENesin (ROBITUSSIN) 100 MG/5ML SYRP Take 5 mLs by mouth every 4 hours as needed for cough     polyvinyl alcohol (LIQUIFILM TEARS) 1.4 % ophthalmic solution 1 drop 4 times daily     polyvinyl alcohol (LIQUIFILM TEARS) 1.4 % ophthalmic solution Place 1 drop into both eyes every 2 hours as needed (May keep in room ans self administer)      nystatin (MYCOSTATIN) 562678 UNIT/GM POWD Apply topically 2 times daily 1 application twice dailt to reddened areas of skin for rash.     sennosides (SENOKOT) 8.6 MG tablet Take 3 tablets by mouth At Bedtime     SIMETHICONE-80 PO Take 80 mg by mouth 2 times daily After lunch and supper     ACETAMINOPHEN PO Take 1,000 mg by mouth 3 times daily     divalproex (DEPAKOTE ER) 500 MG 24 hr tablet Take 1,000 mg by mouth At Bedtime Give with 250mg at bedtime     ARIPiprazole (ABILIFY) 30 MG tablet Take 30 mg by mouth daily.     AMOXICILLIN PO Take 2,000 g by mouth as needed. 2000 Grams before dental procedures     Propranolol HCl (INDERAL) 60 MG TABS Take 60 mg by mouth 3 times daily      budesonide-formoterol (SYMBICORT) 80-4.5 MCG/ACT inhaler Inhale 2 puffs into the lungs 2 times daily.     simvastatin (ZOCOR) 20 MG tablet Take 20 mg by mouth At Bedtime.     sertraline (ZOLOFT) 100 MG tablet Take 250 mg by mouth daily      loperamide (IMODIUM A-D) 2 MG tablet Take 2 mg by mouth 4 times daily as needed (up to 8mg/24 hours)      aspirin (ASPIRIN EC LO-DOSE) 81 MG EC tablet Take 81 mg by mouth every other day      Omeprazole 20 MG tablet Take 40 mg by mouth daily      ipratropium (ATROVENT) 0.03 % spray Spray 2 sprays into both nostrils every 12 hours     cetirizine (ZYRTEC) 10 MG tablet Take 10 mg by mouth daily     fluticasone (FLONASE ALLERGY RELIEF) 50 MCG/ACT spray Spray 1 spray into both nostrils daily     Calcium Carb-Cholecalciferol (CALCIUM + D3) 600-200 MG-UNIT TABS Take 1 tablet by mouth 2 times daily     fexofenadine (ALLEGRA) 60 MG tablet Take 60 mg by mouth daily     alum & mag hydroxide-simethicone (MYLANTA/MAALOX) 200-200-20 MG/5ML SUSP Take 30 mLs by mouth every 4 hours as needed (gastric distress)     ipratropium (ATROVENT) 0.02 % nebulizer solution Take 0.5 mg by nebulization every 4 hours as needed (Obstructive chronic airway)     albuterol (2.5 MG/3ML) 0.083% nebulizer solution Take 1 ampule by  nebulization every 4 hours as needed      calcium carbonate (OYST-MAIA) 500 MG tablet Take 2 tablets by mouth 3 times daily After meal     hydrochlorothiazide 12.5 MG TABS Take 12.5 mg by mouth daily      potassium chloride (KLOR-CON) 20 MEQ packet Take 20 mEq by mouth daily      Dextromethorphan-Guaifenesin (ROBITUSSIN DM PO) Take  by mouth every 4 hours as needed.     ORDER FOR DME 2 L.     Calcium Carbonate Antacid (TUMS PO) Take 1 chew tab by mouth 4 times daily as needed      No current facility-administered medications for this visit.                 Physical Exam:     EXAMINATION pertinent findings:   VITAL SIGNS: There were no vitals taken for this visit.  There is no height or weight on file to calculate BMI.  GEN: AOx3, cooperative, no distress  RESP: non labored breathing   ABD: benign   SKIN: grossly normal   LYMPHATIC: grossly normal   NEURO: grossly normal   VASCULAR: satisfactory perfusion of all extremities  MUSCULOSKELETAL:   She has a well-healed midline incision. Her passive range of motion is from 0 to about 115 . She has a 40  extensor leg. Again, passively she has full extension. Ankle plantarflexion and dorsiflexion is intact. She has no numbness or tingling in her foot.  4 out of 5 strength with the knee in 40  with quadriceps.             Data:   Imaging:   Plain films of the right knee reviewed which demonstrate a cemented cruciate retaining total knee replacement. The patella is proximal consistent with her patellar tendon rupture.    Previously infected his workup was reviewed and is not concerning for infection and is in the results section.         Assessment and Plan:   Assessment:    Nela is a very pleasant 77-year-old woman with chronic patellar tendon rupture. She has made excellent progress with physical therapy. I do long discussion with her and her primary goal is to return home from [facility]. I discussed that many patients with chronic patellar rupture is able to mobilize  without a knee brace. I think that the difficulty she has with mobilizing at this point is from secondary issues. Certainly the patellar tendon rupture contributes to this. However with continued strengthening and in particular strengthening of her upper body I would expect her to mobilize better and be able to do transfers. She has made excellent progress and feels like she continues to do so. We discussed that any sort of extensor mechanism reconstruction would be a risky surgery including the risks of infection, wound healing problems, rerupture and failure. Given her comorbidities and she is at a site higher risk for Occasions. We also discussed that she would need to be in a knee immobilizer or a cast with no knee range of motion for 3 months. Discussionshewishestonotpursueoperativetreatmentatthispoint,whichIagreewithIVthinkshewillcontinuemakeprogresswithphysicaltherapy.  Otherwise, she will return to clinic in 6 months. She'll contact us in the meantime if she has any questions or concerns.        Case Wilkinson M.D.     Arthritis and Joint Replacement  Department of Orthopaedic Surgery, [facility]  [email or web address]  [phone] (pager)

## 2018-03-14 NOTE — PROGRESS NOTES
[facility] Physicians, Orthopaedic Surgery, Arthritis, Hip and Knee Replacement    Nela Suazo MRN# [id number]   Age: 77 year old YOB: 1940     Requesting physician: Carlos Ferreira              History of Present Illness:   Nela Suazo is a 77 year old year old female who presents today for evaluation and management of right knee extensor mechanism injury.    Nela continues to make very good progress in physical therapy. She is now able to walk about 50 feet with the use of a walker. Previously she is only able to take a few steps. All she feels like she continues to make progress with his strengthening of her right lower extremity. She is also hoping to work on upper extremity strengthening to further improve her mobilization and potentially discharged from [facility]. The infectious workup obtained at the last visit was negative. She has no pain in the knee.           Past Medical History:     Patient Active Problem List   Diagnosis     Sepsis (H)     Past Medical History:   Diagnosis Date     Anemia      Bipolar 1 disorder (H)      Constipation      COPD (chronic obstructive pulmonary disease) (H)      Cough      Depressive disorder      Hyperlipidaemia hypopotass     Hypertension      Hypopotassemia      Insomnia      MRSA infection      Osteoporosis      Overweight      Polyneuritis      PVD (peripheral vascular disease) (H)      Reflux      Schizo affective schizophrenia (H)      Sleep apnea      Tremor      Uncomplicated asthma        Bipolar - recently saw psychiatrist who said she is optimized and acceptable risk for surgery from psych standpoint.    Prior history of blood clot: none  Prior history of bleeding problems: none  Prior history of anesthetic complications: none  Currently on opioids:  none  History of Diabetes: no           Past Surgical History:   No past surgical history on file.         Social History:     Social History     Social History     Marital status:  Single     Spouse name: N/A     Number of children: N/A     Years of education: N/A     Occupational History     Not on file.     Social History Main Topics     Smoking status: Never Smoker     Smokeless tobacco: Not on file     Alcohol use Not on file     Drug use: Not on file     Sexual activity: Not on file     Other Topics Concern     Not on file     Social History Narrative       She lives in [facility]   Non smoker           Family History:     No family history on file.    No family hx of blood clots.         Medications:     Current Outpatient Prescriptions   Medication Sig     zinc oxide 20 % ointment Apply topically as needed for dry skin or irritation     polyethylene glycol (MIRALAX/GLYCOLAX) powder Take 1 capful by mouth daily     Cholecalciferol (VITAMIN D3) 1000 UNITS CAPS Take 1,000 Units by mouth     glipiZIDE (GLUCOTROL) 10 MG tablet Take 10 mg by mouth     lisinopril (PRINIVIL/ZESTRIL) 2.5 MG tablet Take 2.5 mg by mouth     Ciclopirox (PENLAC EX)      Magnesium Hydroxide (MILK OF MAGNESIA PO)      capsaicin (ZOSTRIX) 0.025 % CREA Apply 1 applicator topically 4 times daily Apply to left shoulder and lower back/spine.     divalproex (DEPAKOTE ER) 250 MG 24 hr tablet Take 250 mg by mouth At Bedtime Give in addition to 1000mg dose at bedtime. Total daily = 1250mg.     guaiFENesin (ROBITUSSIN) 100 MG/5ML SYRP Take 5 mLs by mouth every 4 hours as needed for cough     polyvinyl alcohol (LIQUIFILM TEARS) 1.4 % ophthalmic solution 1 drop 4 times daily     polyvinyl alcohol (LIQUIFILM TEARS) 1.4 % ophthalmic solution Place 1 drop into both eyes every 2 hours as needed (May keep in room ans self administer)     nystatin (MYCOSTATIN) 855551 UNIT/GM POWD Apply topically 2 times daily 1 application twice dailt to reddened areas of skin for rash.     sennosides (SENOKOT) 8.6 MG tablet Take 3 tablets by mouth At Bedtime     SIMETHICONE-80 PO Take 80 mg by mouth 2 times daily After lunch and supper      ACETAMINOPHEN PO Take 1,000 mg by mouth 3 times daily     divalproex (DEPAKOTE ER) 500 MG 24 hr tablet Take 1,000 mg by mouth At Bedtime Give with 250mg at bedtime     ARIPiprazole (ABILIFY) 30 MG tablet Take 30 mg by mouth daily.     AMOXICILLIN PO Take 2,000 g by mouth as needed. 2000 Grams before dental procedures     Propranolol HCl (INDERAL) 60 MG TABS Take 60 mg by mouth 3 times daily      budesonide-formoterol (SYMBICORT) 80-4.5 MCG/ACT inhaler Inhale 2 puffs into the lungs 2 times daily.     simvastatin (ZOCOR) 20 MG tablet Take 20 mg by mouth At Bedtime.     sertraline (ZOLOFT) 100 MG tablet Take 250 mg by mouth daily      loperamide (IMODIUM A-D) 2 MG tablet Take 2 mg by mouth 4 times daily as needed (up to 8mg/24 hours)      aspirin (ASPIRIN EC LO-DOSE) 81 MG EC tablet Take 81 mg by mouth every other day      Omeprazole 20 MG tablet Take 40 mg by mouth daily      ipratropium (ATROVENT) 0.03 % spray Spray 2 sprays into both nostrils every 12 hours     cetirizine (ZYRTEC) 10 MG tablet Take 10 mg by mouth daily     fluticasone (FLONASE ALLERGY RELIEF) 50 MCG/ACT spray Spray 1 spray into both nostrils daily     Calcium Carb-Cholecalciferol (CALCIUM + D3) 600-200 MG-UNIT TABS Take 1 tablet by mouth 2 times daily     fexofenadine (ALLEGRA) 60 MG tablet Take 60 mg by mouth daily     alum & mag hydroxide-simethicone (MYLANTA/MAALOX) 200-200-20 MG/5ML SUSP Take 30 mLs by mouth every 4 hours as needed (gastric distress)     ipratropium (ATROVENT) 0.02 % nebulizer solution Take 0.5 mg by nebulization every 4 hours as needed (Obstructive chronic airway)     albuterol (2.5 MG/3ML) 0.083% nebulizer solution Take 1 ampule by nebulization every 4 hours as needed      calcium carbonate (OYST-MAIA) 500 MG tablet Take 2 tablets by mouth 3 times daily After meal     hydrochlorothiazide 12.5 MG TABS Take 12.5 mg by mouth daily      potassium chloride (KLOR-CON) 20 MEQ packet Take 20 mEq by mouth daily       Dextromethorphan-Guaifenesin (ROBITUSSIN DM PO) Take  by mouth every 4 hours as needed.     ORDER FOR DME 2 L.     Calcium Carbonate Antacid (TUMS PO) Take 1 chew tab by mouth 4 times daily as needed      No current facility-administered medications for this visit.                 Physical Exam:     EXAMINATION pertinent findings:   VITAL SIGNS: There were no vitals taken for this visit.  There is no height or weight on file to calculate BMI.  GEN: AOx3, cooperative, no distress  RESP: non labored breathing   ABD: benign   SKIN: grossly normal   LYMPHATIC: grossly normal   NEURO: grossly normal   VASCULAR: satisfactory perfusion of all extremities  MUSCULOSKELETAL:   She has a well-healed midline incision. Her passive range of motion is from 0 to about 115 . She has a 40  extensor leg. Again, passively she has full extension. Ankle plantarflexion and dorsiflexion is intact. She has no numbness or tingling in her foot.  4 out of 5 strength with the knee in 40  with quadriceps.             Data:   Imaging:   Plain films of the right knee reviewed which demonstrate a cemented cruciate retaining total knee replacement. The patella is proximal consistent with her patellar tendon rupture.    Previously infected his workup was reviewed and is not concerning for infection and is in the results section.         Assessment and Plan:   Assessment:    Nela is a very pleasant 77-year-old woman with chronic patellar tendon rupture. She has made excellent progress with physical therapy. I do long discussion with her and her primary goal is to return home from [facility]. I discussed that many patients with chronic patellar rupture is able to mobilize without a knee brace. I think that the difficulty she has with mobilizing at this point is from secondary issues. Certainly the patellar tendon rupture contributes to this. However with continued strengthening and in particular strengthening of her upper body I would expect her to  mobilize better and be able to do transfers. She has made excellent progress and feels like she continues to do so. We discussed that any sort of extensor mechanism reconstruction would be a risky surgery including the risks of infection, wound healing problems, rerupture and failure. Given her comorbidities and she is at a site higher risk for Occasions. We also discussed that she would need to be in a knee immobilizer or a cast with no knee range of motion for 3 months. Discussionshewishestonotpursueoperativetreatmentatthispoint,whichIagreewithIVthinkshewillcontinuemakeprogresswithphysicaltherapy.  Otherwise, she will return to clinic in 6 months. She'll contact us in the meantime if she has any questions or concerns.        Csae Wilkinson M.D.     Arthritis and Joint Replacement  Department of Orthopaedic Surgery, [facility]  [email or web address]  [phone] (pager)           Review of Systems:

## 2018-04-08 ENCOUNTER — RECORDS - HEALTHEAST (OUTPATIENT)
Dept: LAB | Facility: CLINIC | Age: 78
End: 2018-04-08

## 2018-04-09 LAB — HBA1C MFR BLD: 6.3 % (ref 4.2–6.1)

## 2018-04-26 ENCOUNTER — RECORDS - HEALTHEAST (OUTPATIENT)
Dept: LAB | Facility: CLINIC | Age: 78
End: 2018-04-26

## 2018-04-27 LAB
AMMONIA PLAS-SCNC: 37 UMOL/L (ref 11–35)
BASOPHILS # BLD AUTO: 0 THOU/UL (ref 0–0.2)
BASOPHILS NFR BLD AUTO: 1 % (ref 0–2)
EOSINOPHIL # BLD AUTO: 0.1 THOU/UL (ref 0–0.4)
EOSINOPHIL NFR BLD AUTO: 2 % (ref 0–6)
ERYTHROCYTE [DISTWIDTH] IN BLOOD BY AUTOMATED COUNT: 14.3 % (ref 11–14.5)
HCT VFR BLD AUTO: 27.8 % (ref 35–47)
HGB BLD-MCNC: 8.6 G/DL (ref 12–16)
LYMPHOCYTES # BLD AUTO: 2 THOU/UL (ref 0.8–4.4)
LYMPHOCYTES NFR BLD AUTO: 32 % (ref 20–40)
MCH RBC QN AUTO: 27.3 PG (ref 27–34)
MCHC RBC AUTO-ENTMCNC: 30.9 G/DL (ref 32–36)
MCV RBC AUTO: 88 FL (ref 80–100)
MONOCYTES # BLD AUTO: 0.9 THOU/UL (ref 0–0.9)
MONOCYTES NFR BLD AUTO: 14 % (ref 2–10)
NEUTROPHILS # BLD AUTO: 3.2 THOU/UL (ref 2–7.7)
NEUTROPHILS NFR BLD AUTO: 51 % (ref 50–70)
PLATELET # BLD AUTO: 213 THOU/UL (ref 140–440)
PMV BLD AUTO: 10.1 FL (ref 8.5–12.5)
RBC # BLD AUTO: 3.15 MILL/UL (ref 3.8–5.4)
VALPROATE SERPL-MCNC: 37.7 UG/ML (ref 50–150)
WBC: 6.4 THOU/UL (ref 4–11)

## 2018-06-14 ENCOUNTER — RECORDS - HEALTHEAST (OUTPATIENT)
Dept: LAB | Facility: CLINIC | Age: 78
End: 2018-06-14

## 2018-06-15 LAB
FERRITIN SERPL-MCNC: 18 NG/ML (ref 10–130)
IRON SERPL-MCNC: 34 UG/DL (ref 42–175)

## 2018-09-18 ENCOUNTER — RECORDS - HEALTHEAST (OUTPATIENT)
Dept: LAB | Facility: CLINIC | Age: 78
End: 2018-09-18

## 2018-09-19 LAB
ANION GAP SERPL CALCULATED.3IONS-SCNC: 6 MMOL/L (ref 5–18)
BUN SERPL-MCNC: 36 MG/DL (ref 8–28)
CALCIUM SERPL-MCNC: 9.6 MG/DL (ref 8.5–10.5)
CHLORIDE BLD-SCNC: 108 MMOL/L (ref 98–107)
CO2 SERPL-SCNC: 27 MMOL/L (ref 22–31)
CREAT SERPL-MCNC: 1.04 MG/DL (ref 0.6–1.1)
ERYTHROCYTE [DISTWIDTH] IN BLOOD BY AUTOMATED COUNT: 17.4 % (ref 11–14.5)
FERRITIN SERPL-MCNC: 58 NG/ML (ref 10–130)
GFR SERPL CREATININE-BSD FRML MDRD: 51 ML/MIN/1.73M2
GLUCOSE BLD-MCNC: 100 MG/DL (ref 70–125)
HBA1C MFR BLD: 6.2 % (ref 4.2–6.1)
HCT VFR BLD AUTO: 31.6 % (ref 35–47)
HGB BLD-MCNC: 10.3 G/DL (ref 12–16)
MCH RBC QN AUTO: 28.9 PG (ref 27–34)
MCHC RBC AUTO-ENTMCNC: 32.6 G/DL (ref 32–36)
MCV RBC AUTO: 89 FL (ref 80–100)
PLATELET # BLD AUTO: 245 THOU/UL (ref 140–440)
PMV BLD AUTO: 9.5 FL (ref 8.5–12.5)
POTASSIUM BLD-SCNC: 4.4 MMOL/L (ref 3.5–5)
RBC # BLD AUTO: 3.56 MILL/UL (ref 3.8–5.4)
SODIUM SERPL-SCNC: 141 MMOL/L (ref 136–145)
WBC: 8.3 THOU/UL (ref 4–11)

## 2018-11-05 ENCOUNTER — RECORDS - HEALTHEAST (OUTPATIENT)
Dept: LAB | Facility: CLINIC | Age: 78
End: 2018-11-05

## 2018-11-06 ENCOUNTER — RECORDS - HEALTHEAST (OUTPATIENT)
Dept: LAB | Facility: CLINIC | Age: 78
End: 2018-11-06

## 2018-11-07 ENCOUNTER — OFFICE VISIT (OUTPATIENT)
Dept: ORTHOPEDICS | Facility: CLINIC | Age: 78
End: 2018-11-07
Payer: COMMERCIAL

## 2018-11-07 DIAGNOSIS — Z96.651 STATUS POST TOTAL RIGHT KNEE REPLACEMENT: Primary | ICD-10-CM

## 2018-11-07 LAB — AMMONIA PLAS-SCNC: 29 UMOL/L (ref 11–35)

## 2018-11-07 ASSESSMENT — ENCOUNTER SYMPTOMS
SMELL DISTURBANCE: 0
NECK PAIN: 0
BACK PAIN: 1
MUSCLE CRAMPS: 0
NAIL CHANGES: 1
POOR WOUND HEALING: 0
EYE WATERING: 1
SINUS PAIN: 0
FLANK PAIN: 0
HEMATURIA: 0
DIFFICULTY URINATING: 0
HYPERTENSION: 1
DYSURIA: 0
STIFFNESS: 1
ARTHRALGIAS: 1
BRUISES/BLEEDS EASILY: 0
DOUBLE VISION: 0
EYE IRRITATION: 0
EYE REDNESS: 1
SKIN CHANGES: 0
SLEEP DISTURBANCES DUE TO BREATHING: 0
HYPOTENSION: 0
EYE PAIN: 1
JOINT SWELLING: 0
LIGHT-HEADEDNESS: 0
LEG PAIN: 0
PALPITATIONS: 0
NECK MASS: 0
MYALGIAS: 1
SORE THROAT: 1
ORTHOPNEA: 0
HOARSE VOICE: 0
SWOLLEN GLANDS: 0
TROUBLE SWALLOWING: 0
TASTE DISTURBANCE: 0
SINUS CONGESTION: 1
MUSCLE WEAKNESS: 0
EXERCISE INTOLERANCE: 1
SYNCOPE: 0

## 2018-11-07 NOTE — LETTER
11/7/2018       RE: Nela Suazo  [address]  [facility] [address]     Dear Colleague,    Thank you for referring your patient, Nela Suazo, to the [facility]. Please see a copy of my visit note below.    [facility], Orthopaedic Surgery, Arthritis, Hip and Knee Replacement    Nela Suazo MRN# [id number]   Age: 77 year old YOB: 1940     Requesting physician: Carlos Ferreira              History of Present Illness:   Nela Suazo is a 77 year old year old female who presents today for evaluation and management of right knee extensor mechanism injury.    Nela returns today for follow-up of her extensor mechanism injury.  She is making good progress with physical therapy.  She is actually progressed in her ability to ambulate with the use of a walker and is very happy with the progress she has made.  Otherwise she continues to get around with the use of her motorized wheelchair.  She is very happy and somewhat surprised by the progress she has made over the past few months.           Past Medical History:     Patient Active Problem List   Diagnosis     Sepsis (H)     Past Medical History:   Diagnosis Date     Anemia      Bipolar 1 disorder (H)      Constipation      COPD (chronic obstructive pulmonary disease) (H)      Cough      Depressive disorder      Hyperlipidaemia hypopotass     Hypertension      Hypopotassemia      Insomnia      MRSA infection      Osteoporosis      Overweight      Polyneuritis      PVD (peripheral vascular disease) (H)      Reflux      Schizo affective schizophrenia (H)      Sleep apnea      Tremor      Uncomplicated asthma        Bipolar - recently saw psychiatrist who said she is optimized and acceptable risk for surgery from psych standpoint.    Prior history of blood clot: none  Prior history of bleeding problems: none  Prior history of anesthetic  complications: none  Currently on opioids:  none  History of Diabetes: no           Past Surgical History:   No past surgical history on file.         Social History:     Social History     Social History     Marital status: Single     Spouse name: N/A     Number of children: N/A     Years of education: N/A     Occupational History     Not on file.     Social History Main Topics     Smoking status: Never Smoker     Smokeless tobacco: Not on file     Alcohol use Not on file     Drug use: Not on file     Sexual activity: Not on file     Other Topics Concern     Not on file     Social History Narrative       She lives in [facility]   Non smoker           Family History:     No family history on file.    No family hx of blood clots.         Medications:     Current Outpatient Prescriptions   Medication Sig     ACETAMINOPHEN PO Take 1,000 mg by mouth 3 times daily     albuterol (2.5 MG/3ML) 0.083% nebulizer solution Take 1 ampule by nebulization every 4 hours as needed      alum & mag hydroxide-simethicone (MYLANTA/MAALOX) 200-200-20 MG/5ML SUSP Take 30 mLs by mouth every 4 hours as needed (gastric distress)     AMOXICILLIN PO Take 2,000 g by mouth as needed. 2000 Grams before dental procedures     ARIPiprazole (ABILIFY) 30 MG tablet Take 30 mg by mouth daily.     aspirin (ASPIRIN EC LO-DOSE) 81 MG EC tablet Take 81 mg by mouth every other day      budesonide-formoterol (SYMBICORT) 80-4.5 MCG/ACT inhaler Inhale 2 puffs into the lungs 2 times daily.     Calcium Carb-Cholecalciferol (CALCIUM + D3) 600-200 MG-UNIT TABS Take 1 tablet by mouth 2 times daily     calcium carbonate (OYST-MAIA) 500 MG tablet Take 2 tablets by mouth 3 times daily After meal     Calcium Carbonate Antacid (TUMS PO) Take 1 chew tab by mouth 4 times daily as needed      capsaicin (ZOSTRIX) 0.025 % CREA Apply 1 applicator topically 4 times daily Apply to left shoulder and lower back/spine.     cetirizine (ZYRTEC) 10 MG tablet Take 10 mg by mouth daily      Cholecalciferol (VITAMIN D3) 1000 UNITS CAPS Take 1,000 Units by mouth     Ciclopirox (PENLAC EX)      Dextromethorphan-Guaifenesin (ROBITUSSIN DM PO) Take  by mouth every 4 hours as needed.     divalproex (DEPAKOTE ER) 250 MG 24 hr tablet Take 250 mg by mouth At Bedtime Give in addition to 1000mg dose at bedtime. Total daily = 1250mg.     divalproex (DEPAKOTE ER) 500 MG 24 hr tablet Take 1,000 mg by mouth At Bedtime Give with 250mg at bedtime     fexofenadine (ALLEGRA) 60 MG tablet Take 60 mg by mouth daily     fluticasone (FLONASE ALLERGY RELIEF) 50 MCG/ACT spray Spray 1 spray into both nostrils daily     glipiZIDE (GLUCOTROL) 10 MG tablet Take 10 mg by mouth     guaiFENesin (ROBITUSSIN) 100 MG/5ML SYRP Take 5 mLs by mouth every 4 hours as needed for cough     hydrochlorothiazide 12.5 MG TABS Take 12.5 mg by mouth daily      ipratropium (ATROVENT) 0.02 % nebulizer solution Take 0.5 mg by nebulization every 4 hours as needed (Obstructive chronic airway)     ipratropium (ATROVENT) 0.03 % spray Spray 2 sprays into both nostrils every 12 hours     lisinopril (PRINIVIL/ZESTRIL) 2.5 MG tablet Take 2.5 mg by mouth     loperamide (IMODIUM A-D) 2 MG tablet Take 2 mg by mouth 4 times daily as needed (up to 8mg/24 hours)      Magnesium Hydroxide (MILK OF MAGNESIA PO)      nystatin (MYCOSTATIN) 460113 UNIT/GM POWD Apply topically 2 times daily 1 application twice dailt to reddened areas of skin for rash.     Omeprazole 20 MG tablet Take 40 mg by mouth daily      ORDER FOR DME 2 L.     polyethylene glycol (MIRALAX/GLYCOLAX) powder Take 1 capful by mouth daily     polyvinyl alcohol (LIQUIFILM TEARS) 1.4 % ophthalmic solution 1 drop 4 times daily     polyvinyl alcohol (LIQUIFILM TEARS) 1.4 % ophthalmic solution Place 1 drop into both eyes every 2 hours as needed (May keep in room ans self administer)     potassium chloride (KLOR-CON) 20 MEQ packet Take 20 mEq by mouth daily      Propranolol HCl (INDERAL) 60 MG TABS Take 60  mg by mouth 3 times daily      sennosides (SENOKOT) 8.6 MG tablet Take 3 tablets by mouth At Bedtime     sertraline (ZOLOFT) 100 MG tablet Take 250 mg by mouth daily      SIMETHICONE-80 PO Take 80 mg by mouth 2 times daily After lunch and supper     simvastatin (ZOCOR) 20 MG tablet Take 20 mg by mouth At Bedtime.     zinc oxide 20 % ointment Apply topically as needed for dry skin or irritation     No current facility-administered medications for this visit.                 Physical Exam:     EXAMINATION pertinent findings:   VITAL SIGNS: There were no vitals taken for this visit.  There is no height or weight on file to calculate BMI.  GEN: AOx3, cooperative, no distress  RESP: non labored breathing   ABD: benign   SKIN: grossly normal   LYMPHATIC: grossly normal   NEURO: grossly normal   VASCULAR: satisfactory perfusion of all extremities  MUSCULOSKELETAL:   She has a well-healed midline incision. Her passive range of motion is from 0 to about 115 . She has a 40  extensor lag. Again, passively she has full extension. Ankle plantarflexion and dorsiflexion is intact. She has no numbness or tingling in her foot.  4 out of 5 strength with the knee in 40  with quadriceps.             Data:   Imaging:   No new imaging..          Assessment and Plan:   Assessment:  Nela  Has made excellent progress.  She is ambulating better than she had been many months ago.  She will continue to work with physical therapy for mobilization and use the walker for ambulation.  If she begins to see any setbacks in her mobility she will return to clinic on a as needed basis and we would reconsider the extensor mechanism reconstruction.  Nonetheless as we discussed in the past this would be a big undertaking given her current mobility level.  She wishes to hold off and she will let us know if she has any questions going forward.    Case Wilkinson M.D.     Arthritis and Joint Replacement  Department of Orthopaedic  Surgery, [facility]  [email or web address]  [phone] (pager)           Review of Systems:     Answers for HPI/ROS submitted by the patient on 11/7/2018   General Symptoms: No  Skin Symptoms: Yes  HENT Symptoms: Yes  EYE SYMPTOMS: Yes  HEART SYMPTOMS: Yes  LUNG SYMPTOMS: No  INTESTINAL SYMPTOMS: No  URINARY SYMPTOMS: Yes  GYNECOLOGIC SYMPTOMS: No  BREAST SYMPTOMS: No  SKELETAL SYMPTOMS: Yes  BLOOD SYMPTOMS: Yes  NERVOUS SYSTEM SYMPTOMS: No  MENTAL HEALTH SYMPTOMS: No  Changes in hair: No  Changes in moles/birth marks: No  Itching: No  Rashes: No  Changes in nails: Yes  Acne: No  Hair in places you don't want it: Yes  Change in facial hair: Yes  Warts: No  Non-healing sores: No  Scarring: No  Flaking of skin: No  Color changes of hands/feet in cold : No  Sun sensitivity: No  Ear pain: No  Ear discharge: No  Hearing loss: Yes  Tinnitus: No  Nosebleeds: No  Congestion: Yes  Sinus pain: No  Trouble swallowing: No   Voice hoarseness: No  Mouth sores: No  Sore throat: Yes  Tooth pain: Yes  Gum tenderness: No  Bleeding gums: No  Change in taste: No  Change in sense of smell: No  Dry mouth: No  Hearing aid used: Yes  Neck lump: No  Eye pain: Yes  Vision loss: No  Dry eyes: Yes  Watery eyes: Yes  Eye bulging: No  Double vision: No  Flashing of lights: No  Spots: No  Floaters: No  Redness: Yes  Crossed eyes: No  Tunnel Vision: No  Yellowing of eyes: No  Eye irritation: No  Chest pain or pressure: No  Fast or irregular heartbeat: No  Pain in legs with walking: No  Trouble breathing while lying down: No  Fingers or toes appear blue: No  High blood pressure: Yes  Low blood pressure: No  Fainting: No  Murmurs: Yes  Pacemaker: No  Varicose veins: Yes  Edema or swelling: No  Wake up at night with shortness of breath: No  Light-headedness: No  Exercise intolerance: Yes  Trouble holding urine or incontinence: Yes  Pain or burning: No  Trouble starting or stopping: No  Increased frequency of urination: Yes  Blood in urine:  No  Decreased frequency of urination: No  Frequent nighttime urination: Yes  Flank pain: No  Difficulty emptying bladder: No  Back pain: Yes  Muscle aches: Yes  Neck pain: No  Swollen joints: No  Joint pain: Yes  Bone pain: No  Muscle cramps: No  Muscle weakness: No  Joint stiffness: Yes  Bone fracture: No  Anemia: Yes  Swollen glands: No  Easy bleeding or bruising: No  PHQ-2 Score: 0      Again, thank you for allowing me to participate in the care of your patient.      Sincerely,    Case Wilkinson MD

## 2018-11-07 NOTE — MR AVS SNAPSHOT
After Visit Summary   11/7/2018    Nela Suazo    MRN: [id number]           Patient Information     Date Of Birth          1940        Visit Information        Provider Department      11/7/2018 2:00 PM Case Wiklinson MD [facility]        Today's Diagnoses     Status post total right knee replacement    -  1       Follow-ups after your visit        Additional Services     ORTHOTICS REFERRAL       **This referral order prints off in the [facility]  (Orthotics & Prosthetics) Central Scheduling Office**    The [facility] Scheduling Staff will contact the patient to schedule appointments.     Central Scheduling Contact Information: [phone] ([place])    Orthotics: Right Knee Brace: Hinged knee brace    Please be aware that coverage of these services is subject to the terms and limitations of your health insurance plan.  Call member services at your health plan with any benefit or coverage questions.      Please bring the following to your appointment:    >>   Any x-rays, CTs or MRIs which have been performed.  Contact the facility where they were done to arrange for  prior to your scheduled appointment.    >>   List of current medications   >>   This referral request   >>   Any documents/labs given to you for this referral                  Who to contact     Please call your clinic at [phone] to:    Ask questions about your health    Make or cancel appointments    Discuss your medicines    Learn about your test results    Speak to your doctor            Additional Information About Your Visit        MyChart Information     [employer] is an electronic gateway that provides easy, online access to your medical records. With [employer], you can request a clinic appointment, read your test results, renew a prescription or communicate with your care team.     To sign up for [employer] visit the website at [email or web address]    You will be asked to enter the access code listed below, as well as some personal information. Please follow the directions to create your username and password.     Your access code is: [email or web address]  Expires: 2019  5:30 AM     Your access code will  in 90 days. If you need help or a new code, please contact your [facility] or call [phone] for assistance.        Care EveryWhere ID     This is your Care EveryWhere ID. This could be used by other organizations to access your [place] medical records  [id number]         Blood Pressure from Last 3 Encounters:   14 123/58   11/22/10 132/84    Weight from Last 3 Encounters:   14 125.4 kg (276 lb 7.3 oz)   11/22/10 112.9 kg (249 lb)              We Performed the Following     ORTHOTICS REFERRAL        Primary Care Provider Office Phone # Fax #    Horacio Lazaro  [phone]       [place] [address] [address] [facility] [address]        Equal Access to Services     [facility]: Hadii aad ku hadasho Soomaali, waaxda luqadaha, qaybta kaalmada adeegyada, eugenia bernabe haymellisa jean baptiste . So [facility] [phone].    ATENCIÓN: Si habla español, tiene a lee disposición servicios gratuitos de asistencia lingüística. Llame al [phone].    We comply with applicable federal civil rights laws and [place] laws. We do not discriminate on the basis of race, color, national origin, age, disability, sex, sexual orientation, or gender identity.            Thank you!     Thank you for choosing [facility]  for your care. Our goal is always to provide you with excellent care. Hearing back from our patients is one way we can continue to improve our services. Please take a few minutes to complete the written survey that you may receive in the mail after your visit with us. Thank you!             Your Updated Medication List - Protect others around you: Learn how to safely  use, store and throw away your medicines at www.disposemymeds.org.          This list is accurate as of 11/7/18 11:59 PM.  Always use your most recent med list.                   Brand Name Dispense Instructions for use Diagnosis    ABILIFY 30 MG tablet   Generic drug:  ARIPiprazole      Take 30 mg by mouth daily.        ACETAMINOPHEN PO      Take 1,000 mg by mouth 3 times daily        albuterol (2.5 MG/3ML) 0.083% neb solution      Take 1 ampule by nebulization every 4 hours as needed        alum & mag hydroxide-simethicone 200-200-20 MG/5ML Susp suspension    MYLANTA/MAALOX     Take 30 mLs by mouth every 4 hours as needed (gastric distress)        AMOXICILLIN PO      Take 2,000 g by mouth as needed. 2000 Grams before dental procedures        ASPIRIN EC LO-DOSE 81 MG EC tablet   Generic drug:  aspirin      Take 81 mg by mouth every other day        ATROVENT 0.03 % spray   Generic drug:  ipratropium      Spray 2 sprays into both nostrils every 12 hours        Calcium + D3 600-200 MG-UNIT Tabs      Take 1 tablet by mouth 2 times daily        capsaicin 0.025 % Crea cream    ZOSTRIX     Apply 1 applicator topically 4 times daily Apply to left shoulder and lower back/spine.        cetirizine 10 MG tablet    zyrTEC     Take 10 mg by mouth daily        * divalproex sodium extended-release 250 MG 24 hr tablet    DEPAKOTE ER     Take 250 mg by mouth At Bedtime Give in addition to 1000mg dose at bedtime. Total daily = 1250mg.        * divalproex sodium extended-release 500 MG 24 hr tablet    DEPAKOTE ER     Take 1,000 mg by mouth At Bedtime Give with 250mg at bedtime        fexofenadine 60 MG tablet    ALLEGRA     Take 60 mg by mouth daily        FLONASE ALLERGY RELIEF 50 MCG/ACT spray   Generic drug:  fluticasone      Spray 1 spray into both nostrils daily        glipiZIDE 10 MG tablet    GLUCOTROL     Take 10 mg by mouth        guaiFENesin 100 MG/5ML Syrp    ROBITUSSIN     Take 5 mLs by mouth every 4 hours as needed for  cough        hydrochlorothiazide 12.5 MG Tabs tablet      Take 12.5 mg by mouth daily        IMODIUM A-D 2 MG tablet   Generic drug:  loperamide      Take 2 mg by mouth 4 times daily as needed (up to 8mg/24 hours)        INDERAL 60 MG Tabs   Generic drug:  propranolol HCl      Take 60 mg by mouth 3 times daily        ipratropium 0.02 % neb solution    ATROVENT     Take 0.5 mg by nebulization every 4 hours as needed (Obstructive chronic airway)        lisinopril 2.5 MG tablet    PRINIVIL/Zestril     Take 2.5 mg by mouth        MILK OF MAGNESIA PO           nystatin 335511 UNIT/GM Powd    MYCOSTATIN     Apply topically 2 times daily 1 application twice dailt to reddened areas of skin for rash.        omeprazole 20 MG tablet      Take 40 mg by mouth daily        order for DME      2 L.        OYST-MAIA 500 MG tablet   Generic drug:  calcium carbonate 500 mg (elemental)      Take 2 tablets by mouth 3 times daily After meal        PENLAC EX           polyethylene glycol powder    MIRALAX/GLYCOLAX     Take 1 capful by mouth daily        * polyvinyl alcohol 1.4 % ophthalmic solution    LIQUIFILM TEARS     1 drop 4 times daily        * polyvinyl alcohol 1.4 % ophthalmic solution    LIQUIFILM TEARS     Place 1 drop into both eyes every 2 hours as needed (May keep in room ans self administer)        potassium chloride 20 MEQ Packet    KLOR-CON     Take 20 mEq by mouth daily        ROBITUSSIN DM PO      Take  by mouth every 4 hours as needed.        sennosides 8.6 MG tablet    SENOKOT     Take 3 tablets by mouth At Bedtime        SIMETHICONE-80 PO      Take 80 mg by mouth 2 times daily After lunch and supper        SYMBICORT 80-4.5 MCG/ACT Inhaler   Generic drug:  budesonide-formoterol      Inhale 2 puffs into the lungs 2 times daily.        TUMS PO      Take 1 chew tab by mouth 4 times daily as needed        vitamin D3 1000 units Caps      Take 1,000 Units by mouth        zinc oxide 20 % ointment      Apply topically as needed  for dry skin or irritation        ZOCOR 20 MG tablet   Generic drug:  simvastatin      Take 20 mg by mouth At Bedtime.        ZOLOFT 100 MG tablet   Generic drug:  sertraline      Take 250 mg by mouth daily        * Notice:  This list has 4 medication(s) that are the same as other medications prescribed for you. Read the directions carefully, and ask your doctor or other care provider to review them with you.

## 2018-11-14 NOTE — PROGRESS NOTES
[facility] Physicians, Orthopaedic Surgery, Arthritis, Hip and Knee Replacement    Nela Suazo MRN# [id number]   Age: 77 year old YOB: 1940     Requesting physician: Carlos Ferreira              History of Present Illness:   Nela Suazo is a 77 year old year old female who presents today for evaluation and management of right knee extensor mechanism injury.    Nela returns today for follow-up of her extensor mechanism injury.  She is making good progress with physical therapy.  She is actually progressed in her ability to ambulate with the use of a walker and is very happy with the progress she has made.  Otherwise she continues to get around with the use of her motorized wheelchair.  She is very happy and somewhat surprised by the progress she has made over the past few months.           Past Medical History:     Patient Active Problem List   Diagnosis     Sepsis (H)     Past Medical History:   Diagnosis Date     Anemia      Bipolar 1 disorder (H)      Constipation      COPD (chronic obstructive pulmonary disease) (H)      Cough      Depressive disorder      Hyperlipidaemia hypopotass     Hypertension      Hypopotassemia      Insomnia      MRSA infection      Osteoporosis      Overweight      Polyneuritis      PVD (peripheral vascular disease) (H)      Reflux      Schizo affective schizophrenia (H)      Sleep apnea      Tremor      Uncomplicated asthma        Bipolar - recently saw psychiatrist who said she is optimized and acceptable risk for surgery from psych standpoint.    Prior history of blood clot: none  Prior history of bleeding problems: none  Prior history of anesthetic complications: none  Currently on opioids:  none  History of Diabetes: no           Past Surgical History:   No past surgical history on file.         Social History:     Social History     Social History     Marital status: Single     Spouse name: N/A     Number of children: N/A     Years of education: N/A      Occupational History     Not on file.     Social History Main Topics     Smoking status: Never Smoker     Smokeless tobacco: Not on file     Alcohol use Not on file     Drug use: Not on file     Sexual activity: Not on file     Other Topics Concern     Not on file     Social History Narrative       She lives in [facility]   Non smoker           Family History:     No family history on file.    No family hx of blood clots.         Medications:     Current Outpatient Prescriptions   Medication Sig     ACETAMINOPHEN PO Take 1,000 mg by mouth 3 times daily     albuterol (2.5 MG/3ML) 0.083% nebulizer solution Take 1 ampule by nebulization every 4 hours as needed      alum & mag hydroxide-simethicone (MYLANTA/MAALOX) 200-200-20 MG/5ML SUSP Take 30 mLs by mouth every 4 hours as needed (gastric distress)     AMOXICILLIN PO Take 2,000 g by mouth as needed. 2000 Grams before dental procedures     ARIPiprazole (ABILIFY) 30 MG tablet Take 30 mg by mouth daily.     aspirin (ASPIRIN EC LO-DOSE) 81 MG EC tablet Take 81 mg by mouth every other day      budesonide-formoterol (SYMBICORT) 80-4.5 MCG/ACT inhaler Inhale 2 puffs into the lungs 2 times daily.     Calcium Carb-Cholecalciferol (CALCIUM + D3) 600-200 MG-UNIT TABS Take 1 tablet by mouth 2 times daily     calcium carbonate (OYST-MAIA) 500 MG tablet Take 2 tablets by mouth 3 times daily After meal     Calcium Carbonate Antacid (TUMS PO) Take 1 chew tab by mouth 4 times daily as needed      capsaicin (ZOSTRIX) 0.025 % CREA Apply 1 applicator topically 4 times daily Apply to left shoulder and lower back/spine.     cetirizine (ZYRTEC) 10 MG tablet Take 10 mg by mouth daily     Cholecalciferol (VITAMIN D3) 1000 UNITS CAPS Take 1,000 Units by mouth     Ciclopirox (PENLAC EX)      Dextromethorphan-Guaifenesin (ROBITUSSIN DM PO) Take  by mouth every 4 hours as needed.     divalproex (DEPAKOTE ER) 250 MG 24 hr tablet Take 250 mg by mouth At Bedtime Give in addition to 1000mg dose at  bedtime. Total daily = 1250mg.     divalproex (DEPAKOTE ER) 500 MG 24 hr tablet Take 1,000 mg by mouth At Bedtime Give with 250mg at bedtime     fexofenadine (ALLEGRA) 60 MG tablet Take 60 mg by mouth daily     fluticasone (FLONASE ALLERGY RELIEF) 50 MCG/ACT spray Spray 1 spray into both nostrils daily     glipiZIDE (GLUCOTROL) 10 MG tablet Take 10 mg by mouth     guaiFENesin (ROBITUSSIN) 100 MG/5ML SYRP Take 5 mLs by mouth every 4 hours as needed for cough     hydrochlorothiazide 12.5 MG TABS Take 12.5 mg by mouth daily      ipratropium (ATROVENT) 0.02 % nebulizer solution Take 0.5 mg by nebulization every 4 hours as needed (Obstructive chronic airway)     ipratropium (ATROVENT) 0.03 % spray Spray 2 sprays into both nostrils every 12 hours     lisinopril (PRINIVIL/ZESTRIL) 2.5 MG tablet Take 2.5 mg by mouth     loperamide (IMODIUM A-D) 2 MG tablet Take 2 mg by mouth 4 times daily as needed (up to 8mg/24 hours)      Magnesium Hydroxide (MILK OF MAGNESIA PO)      nystatin (MYCOSTATIN) 742341 UNIT/GM POWD Apply topically 2 times daily 1 application twice dailt to reddened areas of skin for rash.     Omeprazole 20 MG tablet Take 40 mg by mouth daily      ORDER FOR DME 2 L.     polyethylene glycol (MIRALAX/GLYCOLAX) powder Take 1 capful by mouth daily     polyvinyl alcohol (LIQUIFILM TEARS) 1.4 % ophthalmic solution 1 drop 4 times daily     polyvinyl alcohol (LIQUIFILM TEARS) 1.4 % ophthalmic solution Place 1 drop into both eyes every 2 hours as needed (May keep in room ans self administer)     potassium chloride (KLOR-CON) 20 MEQ packet Take 20 mEq by mouth daily      Propranolol HCl (INDERAL) 60 MG TABS Take 60 mg by mouth 3 times daily      sennosides (SENOKOT) 8.6 MG tablet Take 3 tablets by mouth At Bedtime     sertraline (ZOLOFT) 100 MG tablet Take 250 mg by mouth daily      SIMETHICONE-80 PO Take 80 mg by mouth 2 times daily After lunch and supper     simvastatin (ZOCOR) 20 MG tablet Take 20 mg by mouth At  Bedtime.     zinc oxide 20 % ointment Apply topically as needed for dry skin or irritation     No current facility-administered medications for this visit.                 Physical Exam:     EXAMINATION pertinent findings:   VITAL SIGNS: There were no vitals taken for this visit.  There is no height or weight on file to calculate BMI.  GEN: AOx3, cooperative, no distress  RESP: non labored breathing   ABD: benign   SKIN: grossly normal   LYMPHATIC: grossly normal   NEURO: grossly normal   VASCULAR: satisfactory perfusion of all extremities  MUSCULOSKELETAL:   She has a well-healed midline incision. Her passive range of motion is from 0 to about 115 . She has a 40  extensor lag. Again, passively she has full extension. Ankle plantarflexion and dorsiflexion is intact. She has no numbness or tingling in her foot.  4 out of 5 strength with the knee in 40  with quadriceps.             Data:   Imaging:   No new imaging..          Assessment and Plan:   Assessment:  Nela  Has made excellent progress.  She is ambulating better than she had been many months ago.  She will continue to work with physical therapy for mobilization and use the walker for ambulation.  If she begins to see any setbacks in her mobility she will return to clinic on a as needed basis and we would reconsider the extensor mechanism reconstruction.  Nonetheless as we discussed in the past this would be a big undertaking given her current mobility level.  She wishes to hold off and she will let us know if she has any questions going forward.    Case Wilkinson M.D.     Arthritis and Joint Replacement  Department of Orthopaedic Surgery, [facility]  [email or web address]  [phone] (pager)           Review of Systems:     Answers for HPI/ROS submitted by the patient on 11/7/2018   General Symptoms: No  Skin Symptoms: Yes  HENT Symptoms: Yes  EYE SYMPTOMS: Yes  HEART SYMPTOMS: Yes  LUNG SYMPTOMS: No  INTESTINAL SYMPTOMS:  No  URINARY SYMPTOMS: Yes  GYNECOLOGIC SYMPTOMS: No  BREAST SYMPTOMS: No  SKELETAL SYMPTOMS: Yes  BLOOD SYMPTOMS: Yes  NERVOUS SYSTEM SYMPTOMS: No  MENTAL HEALTH SYMPTOMS: No  Changes in hair: No  Changes in moles/birth marks: No  Itching: No  Rashes: No  Changes in nails: Yes  Acne: No  Hair in places you don't want it: Yes  Change in facial hair: Yes  Warts: No  Non-healing sores: No  Scarring: No  Flaking of skin: No  Color changes of hands/feet in cold : No  Sun sensitivity: No  Ear pain: No  Ear discharge: No  Hearing loss: Yes  Tinnitus: No  Nosebleeds: No  Congestion: Yes  Sinus pain: No  Trouble swallowing: No   Voice hoarseness: No  Mouth sores: No  Sore throat: Yes  Tooth pain: Yes  Gum tenderness: No  Bleeding gums: No  Change in taste: No  Change in sense of smell: No  Dry mouth: No  Hearing aid used: Yes  Neck lump: No  Eye pain: Yes  Vision loss: No  Dry eyes: Yes  Watery eyes: Yes  Eye bulging: No  Double vision: No  Flashing of lights: No  Spots: No  Floaters: No  Redness: Yes  Crossed eyes: No  Tunnel Vision: No  Yellowing of eyes: No  Eye irritation: No  Chest pain or pressure: No  Fast or irregular heartbeat: No  Pain in legs with walking: No  Trouble breathing while lying down: No  Fingers or toes appear blue: No  High blood pressure: Yes  Low blood pressure: No  Fainting: No  Murmurs: Yes  Pacemaker: No  Varicose veins: Yes  Edema or swelling: No  Wake up at night with shortness of breath: No  Light-headedness: No  Exercise intolerance: Yes  Trouble holding urine or incontinence: Yes  Pain or burning: No  Trouble starting or stopping: No  Increased frequency of urination: Yes  Blood in urine: No  Decreased frequency of urination: No  Frequent nighttime urination: Yes  Flank pain: No  Difficulty emptying bladder: No  Back pain: Yes  Muscle aches: Yes  Neck pain: No  Swollen joints: No  Joint pain: Yes  Bone pain: No  Muscle cramps: No  Muscle weakness: No  Joint stiffness: Yes  Bone fracture:  No  Anemia: Yes  Swollen glands: No  Easy bleeding or bruising: No  PHQ-2 Score: 0

## 2018-11-27 ENCOUNTER — RECORDS - HEALTHEAST (OUTPATIENT)
Dept: LAB | Facility: CLINIC | Age: 78
End: 2018-11-27

## 2018-11-28 LAB
ANION GAP SERPL CALCULATED.3IONS-SCNC: 5 MMOL/L (ref 5–18)
BUN SERPL-MCNC: 33 MG/DL (ref 8–28)
CALCIUM SERPL-MCNC: 9.8 MG/DL (ref 8.5–10.5)
CHLORIDE BLD-SCNC: 109 MMOL/L (ref 98–107)
CO2 SERPL-SCNC: 28 MMOL/L (ref 22–31)
CREAT SERPL-MCNC: 0.89 MG/DL (ref 0.6–1.1)
GFR SERPL CREATININE-BSD FRML MDRD: >60 ML/MIN/1.73M2
GLUCOSE BLD-MCNC: 100 MG/DL (ref 70–125)
MAGNESIUM SERPL-MCNC: 2 MG/DL (ref 1.8–2.6)
POTASSIUM BLD-SCNC: 4.5 MMOL/L (ref 3.5–5)
SODIUM SERPL-SCNC: 142 MMOL/L (ref 136–145)

## 2018-12-03 DIAGNOSIS — M76.51 PATELLAR TENDINITIS OF RIGHT KNEE: Primary | ICD-10-CM

## 2018-12-03 DIAGNOSIS — M66.88: ICD-10-CM

## 2019-01-22 ENCOUNTER — RECORDS - HEALTHEAST (OUTPATIENT)
Dept: LAB | Facility: CLINIC | Age: 79
End: 2019-01-22

## 2019-01-23 LAB — HBA1C MFR BLD: 6 % (ref 4.2–6.1)

## 2019-01-24 LAB
ALBUMIN SERPL-MCNC: 2.9 G/DL (ref 3.5–5)
ALP SERPL-CCNC: 71 U/L (ref 45–120)
ALT SERPL W P-5'-P-CCNC: <9 U/L (ref 0–45)
ALT SERPL W P-5'-P-CCNC: <9 U/L (ref 0–45)
AST SERPL W P-5'-P-CCNC: 10 U/L (ref 0–40)
AST SERPL W P-5'-P-CCNC: 9 U/L (ref 0–40)
BASOPHILS # BLD AUTO: 0 THOU/UL (ref 0–0.2)
BASOPHILS NFR BLD AUTO: 1 % (ref 0–2)
BILIRUB DIRECT SERPL-MCNC: 0.1 MG/DL
BILIRUB SERPL-MCNC: 0.3 MG/DL (ref 0–1)
EOSINOPHIL # BLD AUTO: 0.2 THOU/UL (ref 0–0.4)
EOSINOPHIL NFR BLD AUTO: 3 % (ref 0–6)
ERYTHROCYTE [DISTWIDTH] IN BLOOD BY AUTOMATED COUNT: 15.1 % (ref 11–14.5)
HCT VFR BLD AUTO: 30.1 % (ref 35–47)
HGB BLD-MCNC: 9.7 G/DL (ref 12–16)
LYMPHOCYTES # BLD AUTO: 1.7 THOU/UL (ref 0.8–4.4)
LYMPHOCYTES NFR BLD AUTO: 30 % (ref 20–40)
MCH RBC QN AUTO: 29.9 PG (ref 27–34)
MCHC RBC AUTO-ENTMCNC: 32.2 G/DL (ref 32–36)
MCV RBC AUTO: 93 FL (ref 80–100)
MONOCYTES # BLD AUTO: 0.5 THOU/UL (ref 0–0.9)
MONOCYTES NFR BLD AUTO: 9 % (ref 2–10)
NEUTROPHILS # BLD AUTO: 3.4 THOU/UL (ref 2–7.7)
NEUTROPHILS NFR BLD AUTO: 58 % (ref 50–70)
PLATELET # BLD AUTO: 208 THOU/UL (ref 140–440)
PMV BLD AUTO: 9.8 FL (ref 8.5–12.5)
PROT SERPL-MCNC: 6.1 G/DL (ref 6–8)
RBC # BLD AUTO: 3.24 MILL/UL (ref 3.8–5.4)
SODIUM SERPL-SCNC: 142 MMOL/L (ref 136–145)
VALPROATE SERPL-MCNC: 45.4 UG/ML (ref 50–150)
WBC: 5.9 THOU/UL (ref 4–11)

## 2019-01-29 ENCOUNTER — RECORDS - HEALTHEAST (OUTPATIENT)
Dept: LAB | Facility: CLINIC | Age: 79
End: 2019-01-29

## 2019-01-29 LAB
ALT SERPL W P-5'-P-CCNC: <9 U/L (ref 0–45)
AST SERPL W P-5'-P-CCNC: 11 U/L (ref 0–40)
BASOPHILS # BLD AUTO: 0 THOU/UL (ref 0–0.2)
BASOPHILS NFR BLD AUTO: 1 % (ref 0–2)
EOSINOPHIL # BLD AUTO: 0.2 THOU/UL (ref 0–0.4)
EOSINOPHIL NFR BLD AUTO: 3 % (ref 0–6)
ERYTHROCYTE [DISTWIDTH] IN BLOOD BY AUTOMATED COUNT: 13.3 % (ref 11–14.5)
HBA1C MFR BLD: 6 % (ref 4.2–6.1)
HCT VFR BLD AUTO: 33.8 % (ref 35–47)
HGB BLD-MCNC: 10.8 G/DL (ref 12–16)
LYMPHOCYTES # BLD AUTO: 1.9 THOU/UL (ref 0.8–4.4)
LYMPHOCYTES NFR BLD AUTO: 29 % (ref 20–40)
MCH RBC QN AUTO: 30.5 PG (ref 27–34)
MCHC RBC AUTO-ENTMCNC: 32 G/DL (ref 32–36)
MCV RBC AUTO: 96 FL (ref 80–100)
MONOCYTES # BLD AUTO: 0.7 THOU/UL (ref 0–0.9)
MONOCYTES NFR BLD AUTO: 10 % (ref 2–10)
NEUTROPHILS # BLD AUTO: 3.6 THOU/UL (ref 2–7.7)
NEUTROPHILS NFR BLD AUTO: 58 % (ref 50–70)
PLATELET # BLD AUTO: 183 THOU/UL (ref 140–440)
PMV BLD AUTO: 10 FL (ref 8.5–12.5)
RBC # BLD AUTO: 3.54 MILL/UL (ref 3.8–5.4)
SODIUM SERPL-SCNC: 141 MMOL/L (ref 136–145)
VALPROATE SERPL-MCNC: 48.9 UG/ML (ref 50–150)
WBC: 6.4 THOU/UL (ref 4–11)

## 2019-03-18 ENCOUNTER — RECORDS - HEALTHEAST (OUTPATIENT)
Dept: LAB | Facility: CLINIC | Age: 79
End: 2019-03-18

## 2019-03-19 LAB
FERRITIN SERPL-MCNC: 83 NG/ML (ref 10–130)
HGB BLD-MCNC: 10.2 G/DL (ref 12–16)

## 2019-03-21 ENCOUNTER — RECORDS - HEALTHEAST (OUTPATIENT)
Dept: LAB | Facility: CLINIC | Age: 79
End: 2019-03-21

## 2019-03-21 LAB
ALBUMIN UR-MCNC: ABNORMAL MG/DL
APPEARANCE UR: ABNORMAL
BACTERIA #/AREA URNS HPF: ABNORMAL HPF
BILIRUB UR QL STRIP: NEGATIVE
COLOR UR AUTO: YELLOW
FLUAV AG SPEC QL IA: NORMAL
FLUBV AG SPEC QL IA: NORMAL
GLUCOSE UR STRIP-MCNC: NEGATIVE MG/DL
HGB UR QL STRIP: ABNORMAL
KETONES UR STRIP-MCNC: NEGATIVE MG/DL
LEUKOCYTE ESTERASE UR QL STRIP: ABNORMAL
NITRATE UR QL: POSITIVE
PH UR STRIP: 6 [PH] (ref 4.5–8)
RBC #/AREA URNS AUTO: ABNORMAL HPF
SP GR UR STRIP: 1.03 (ref 1–1.03)
SQUAMOUS #/AREA URNS AUTO: ABNORMAL LPF
UROBILINOGEN UR STRIP-ACNC: ABNORMAL
WBC #/AREA URNS AUTO: >100 HPF
WBC CLUMPS #/AREA URNS HPF: PRESENT /[HPF]

## 2019-03-23 LAB — BACTERIA SPEC CULT: NORMAL

## 2019-10-07 ENCOUNTER — RECORDS - HEALTHEAST (OUTPATIENT)
Dept: LAB | Facility: CLINIC | Age: 79
End: 2019-10-07

## 2019-10-08 LAB
ANION GAP SERPL CALCULATED.3IONS-SCNC: 8 MMOL/L (ref 5–18)
BUN SERPL-MCNC: 28 MG/DL (ref 8–28)
CALCIUM SERPL-MCNC: 9.4 MG/DL (ref 8.5–10.5)
CHLORIDE BLD-SCNC: 108 MMOL/L (ref 98–107)
CO2 SERPL-SCNC: 25 MMOL/L (ref 22–31)
CREAT SERPL-MCNC: 0.94 MG/DL (ref 0.6–1.1)
ERYTHROCYTE [DISTWIDTH] IN BLOOD BY AUTOMATED COUNT: 13.8 % (ref 11–14.5)
GFR SERPL CREATININE-BSD FRML MDRD: 58 ML/MIN/1.73M2
GLUCOSE BLD-MCNC: 128 MG/DL (ref 70–125)
HCT VFR BLD AUTO: 33.2 % (ref 35–47)
HGB BLD-MCNC: 10.2 G/DL (ref 12–16)
MCH RBC QN AUTO: 29.7 PG (ref 27–34)
MCHC RBC AUTO-ENTMCNC: 30.7 G/DL (ref 32–36)
MCV RBC AUTO: 97 FL (ref 80–100)
PLATELET # BLD AUTO: 182 THOU/UL (ref 140–440)
PMV BLD AUTO: 10.1 FL (ref 8.5–12.5)
POTASSIUM BLD-SCNC: 4.4 MMOL/L (ref 3.5–5)
RBC # BLD AUTO: 3.44 MILL/UL (ref 3.8–5.4)
SODIUM SERPL-SCNC: 141 MMOL/L (ref 136–145)
WBC: 6.9 THOU/UL (ref 4–11)

## 2019-10-09 LAB — HBA1C MFR BLD: 6.8 % (ref 4.2–6.1)

## 2019-10-20 ENCOUNTER — RECORDS - HEALTHEAST (OUTPATIENT)
Dept: LAB | Facility: CLINIC | Age: 79
End: 2019-10-20

## 2019-10-21 LAB
ALP SERPL-CCNC: 72 U/L (ref 45–120)
ALT SERPL W P-5'-P-CCNC: 11 U/L (ref 0–45)
AST SERPL W P-5'-P-CCNC: 11 U/L (ref 0–40)
BILIRUB SERPL-MCNC: 0.2 MG/DL (ref 0–1)
ERYTHROCYTE [DISTWIDTH] IN BLOOD BY AUTOMATED COUNT: 13.3 % (ref 11–14.5)
FOLATE SERPL-MCNC: 9.6 NG/ML
HCT VFR BLD AUTO: 32.6 % (ref 35–47)
HGB BLD-MCNC: 10.3 G/DL (ref 12–16)
MCH RBC QN AUTO: 30.2 PG (ref 27–34)
MCHC RBC AUTO-ENTMCNC: 31.6 G/DL (ref 32–36)
MCV RBC AUTO: 96 FL (ref 80–100)
PLATELET # BLD AUTO: 203 THOU/UL (ref 140–440)
PMV BLD AUTO: 9.8 FL (ref 8.5–12.5)
RBC # BLD AUTO: 3.41 MILL/UL (ref 3.8–5.4)
SODIUM SERPL-SCNC: 141 MMOL/L (ref 136–145)
VALPROATE SERPL-MCNC: 57 UG/ML (ref 50–150)
WBC: 6.1 THOU/UL (ref 4–11)

## 2020-01-28 ENCOUNTER — RECORDS - HEALTHEAST (OUTPATIENT)
Dept: LAB | Facility: CLINIC | Age: 80
End: 2020-01-28

## 2020-01-29 LAB
ANION GAP SERPL CALCULATED.3IONS-SCNC: 11 MMOL/L (ref 5–18)
BUN SERPL-MCNC: 28 MG/DL (ref 8–28)
CALCIUM SERPL-MCNC: 9.4 MG/DL (ref 8.5–10.5)
CHLORIDE BLD-SCNC: 108 MMOL/L (ref 98–107)
CO2 SERPL-SCNC: 24 MMOL/L (ref 22–31)
CREAT SERPL-MCNC: 0.83 MG/DL (ref 0.6–1.1)
GFR SERPL CREATININE-BSD FRML MDRD: >60 ML/MIN/1.73M2
GLUCOSE BLD-MCNC: 136 MG/DL (ref 70–125)
HBA1C MFR BLD: 7.1 % (ref 4.2–6.1)
POTASSIUM BLD-SCNC: 4.6 MMOL/L (ref 3.5–5)
SODIUM SERPL-SCNC: 143 MMOL/L (ref 136–145)

## 2020-07-20 ENCOUNTER — RECORDS - HEALTHEAST (OUTPATIENT)
Dept: LAB | Facility: CLINIC | Age: 80
End: 2020-07-20

## 2020-07-21 LAB
ALBUMIN SERPL-MCNC: 3.1 G/DL (ref 3.5–5)
ALP SERPL-CCNC: 72 U/L (ref 45–120)
ALT SERPL W P-5'-P-CCNC: 11 U/L (ref 0–45)
ANION GAP SERPL CALCULATED.3IONS-SCNC: 9 MMOL/L (ref 5–18)
AST SERPL W P-5'-P-CCNC: 13 U/L (ref 0–40)
BILIRUB SERPL-MCNC: 0.2 MG/DL (ref 0–1)
BUN SERPL-MCNC: 24 MG/DL (ref 8–28)
CALCIUM SERPL-MCNC: 9 MG/DL (ref 8.5–10.5)
CHLORIDE BLD-SCNC: 105 MMOL/L (ref 98–107)
CO2 SERPL-SCNC: 24 MMOL/L (ref 22–31)
CREAT SERPL-MCNC: 0.83 MG/DL (ref 0.6–1.1)
ERYTHROCYTE [DISTWIDTH] IN BLOOD BY AUTOMATED COUNT: 13.1 % (ref 11–14.5)
GFR SERPL CREATININE-BSD FRML MDRD: >60 ML/MIN/1.73M2
GLUCOSE BLD-MCNC: 128 MG/DL (ref 70–125)
HBA1C MFR BLD: 6.9 %
HCT VFR BLD AUTO: 32.8 % (ref 35–47)
HGB BLD-MCNC: 10.7 G/DL (ref 12–16)
MCH RBC QN AUTO: 30.1 PG (ref 27–34)
MCHC RBC AUTO-ENTMCNC: 32.6 G/DL (ref 32–36)
MCV RBC AUTO: 92 FL (ref 80–100)
PLATELET # BLD AUTO: 179 THOU/UL (ref 140–440)
PMV BLD AUTO: 9.8 FL (ref 8.5–12.5)
POTASSIUM BLD-SCNC: 4.3 MMOL/L (ref 3.5–5)
PROT SERPL-MCNC: 5.6 G/DL (ref 6–8)
RBC # BLD AUTO: 3.55 MILL/UL (ref 3.8–5.4)
SODIUM SERPL-SCNC: 138 MMOL/L (ref 136–145)
VALPROATE SERPL-MCNC: 40.4 UG/ML (ref 50–150)
WBC: 5.9 THOU/UL (ref 4–11)

## 2021-04-08 ENCOUNTER — RECORDS - HEALTHEAST (OUTPATIENT)
Dept: LAB | Facility: CLINIC | Age: 81
End: 2021-04-08

## 2021-04-09 LAB
ALT SERPL W P-5'-P-CCNC: <9 U/L (ref 0–45)
AST SERPL W P-5'-P-CCNC: 12 U/L (ref 0–40)
BASOPHILS # BLD AUTO: 0.1 THOU/UL (ref 0–0.2)
BASOPHILS NFR BLD AUTO: 1 % (ref 0–2)
CHOLEST SERPL-MCNC: 202 MG/DL
EOSINOPHIL # BLD AUTO: 0.2 THOU/UL (ref 0–0.4)
EOSINOPHIL NFR BLD AUTO: 2 % (ref 0–6)
ERYTHROCYTE [DISTWIDTH] IN BLOOD BY AUTOMATED COUNT: 12.7 % (ref 11–14.5)
FASTING STATUS PATIENT QL REPORTED: ABNORMAL
FOLATE SERPL-MCNC: 8.5 NG/ML
HBA1C MFR BLD: 6.5 %
HCT VFR BLD AUTO: 37.4 % (ref 35–47)
HDLC SERPL-MCNC: 41 MG/DL
HGB BLD-MCNC: 12.3 G/DL (ref 12–16)
IMM GRANULOCYTES # BLD: 0.1 THOU/UL
IMM GRANULOCYTES NFR BLD: 2 %
LDLC SERPL CALC-MCNC: 109 MG/DL
LYMPHOCYTES # BLD AUTO: 2.3 THOU/UL (ref 0.8–4.4)
LYMPHOCYTES NFR BLD AUTO: 30 % (ref 20–40)
MCH RBC QN AUTO: 30 PG (ref 27–34)
MCHC RBC AUTO-ENTMCNC: 32.9 G/DL (ref 32–36)
MCV RBC AUTO: 91 FL (ref 80–100)
MONOCYTES # BLD AUTO: 0.6 THOU/UL (ref 0–0.9)
MONOCYTES NFR BLD AUTO: 8 % (ref 2–10)
NEUTROPHILS # BLD AUTO: 4.5 THOU/UL (ref 2–7.7)
NEUTROPHILS NFR BLD AUTO: 58 % (ref 50–70)
PLATELET # BLD AUTO: 211 THOU/UL (ref 140–440)
PMV BLD AUTO: 10.1 FL (ref 8.5–12.5)
RBC # BLD AUTO: 4.1 MILL/UL (ref 3.8–5.4)
SODIUM SERPL-SCNC: 139 MMOL/L (ref 136–145)
TRIGL SERPL-MCNC: 261 MG/DL
VALPROATE SERPL-MCNC: 51.9 UG/ML (ref 50–150)
WBC: 7.8 THOU/UL (ref 4–11)

## 2021-04-12 LAB — 25(OH)D3 SERPL-MCNC: 32.1 NG/ML (ref 30–80)

## 2021-06-22 ENCOUNTER — RECORDS - HEALTHEAST (OUTPATIENT)
Dept: LAB | Facility: CLINIC | Age: 81
End: 2021-06-22

## 2021-06-23 LAB
ANION GAP SERPL CALCULATED.3IONS-SCNC: 11 MMOL/L (ref 5–18)
BUN SERPL-MCNC: 21 MG/DL (ref 8–28)
CALCIUM SERPL-MCNC: 9.5 MG/DL (ref 8.5–10.5)
CHLORIDE BLD-SCNC: 103 MMOL/L (ref 98–107)
CO2 SERPL-SCNC: 25 MMOL/L (ref 22–31)
CREAT SERPL-MCNC: 0.79 MG/DL (ref 0.6–1.1)
GFR SERPL CREATININE-BSD FRML MDRD: >60 ML/MIN/1.73M2
GLUCOSE BLD-MCNC: 143 MG/DL (ref 70–125)
POTASSIUM BLD-SCNC: 4.7 MMOL/L (ref 3.5–5)
SODIUM SERPL-SCNC: 139 MMOL/L (ref 136–145)

## 2021-06-28 ENCOUNTER — RECORDS - HEALTHEAST (OUTPATIENT)
Dept: LAB | Facility: CLINIC | Age: 81
End: 2021-06-28

## 2021-06-28 LAB
SARS-COV-2 PCR COMMENT: NORMAL
SARS-COV-2 RNA SPEC QL NAA+PROBE: NEGATIVE
SARS-COV-2 VIRUS SPECIMEN SOURCE: NORMAL

## 2021-07-11 ENCOUNTER — LAB REQUISITION (OUTPATIENT)
Dept: LAB | Facility: CLINIC | Age: 81
End: 2021-07-11
Payer: COMMERCIAL

## 2021-07-11 DIAGNOSIS — Z11.59 ENCOUNTER FOR SCREENING FOR OTHER VIRAL DISEASES: ICD-10-CM

## 2021-07-11 PROCEDURE — U0005 INFEC AGEN DETEC AMPLI PROBE: HCPCS | Mod: ORL | Performed by: FAMILY MEDICINE

## 2021-07-12 LAB — SARS-COV-2 RNA RESP QL NAA+PROBE: NEGATIVE

## 2021-08-24 ENCOUNTER — HOSPITAL ENCOUNTER (OUTPATIENT)
Dept: MAMMOGRAPHY | Facility: CLINIC | Age: 81
Discharge: HOME | End: 2021-08-24
Attending: FAMILY MEDICINE | Admitting: FAMILY MEDICINE
Payer: COMMERCIAL

## 2021-08-24 DIAGNOSIS — Z12.31 VISIT FOR SCREENING MAMMOGRAM: ICD-10-CM

## 2021-08-24 PROCEDURE — 77063 BREAST TOMOSYNTHESIS BI: CPT

## 2021-09-15 ENCOUNTER — LAB REQUISITION (OUTPATIENT)
Dept: LAB | Facility: CLINIC | Age: 81
End: 2021-09-15
Payer: COMMERCIAL

## 2021-09-15 DIAGNOSIS — E11.21 TYPE 2 DIABETES MELLITUS WITH DIABETIC NEPHROPATHY (H): ICD-10-CM

## 2021-09-16 LAB
HBA1C MFR BLD: 6.3 %
HGB BLD-MCNC: 10.5 G/DL (ref 11.7–15.7)

## 2021-09-16 PROCEDURE — 85018 HEMOGLOBIN: CPT | Mod: ORL | Performed by: FAMILY MEDICINE

## 2021-09-16 PROCEDURE — 36415 COLL VENOUS BLD VENIPUNCTURE: CPT | Mod: ORL | Performed by: FAMILY MEDICINE

## 2021-09-16 PROCEDURE — P9603 ONE-WAY ALLOW PRORATED MILES: HCPCS | Mod: ORL | Performed by: FAMILY MEDICINE

## 2021-09-16 PROCEDURE — 83036 HEMOGLOBIN GLYCOSYLATED A1C: CPT | Mod: ORL | Performed by: FAMILY MEDICINE

## 2021-10-04 ENCOUNTER — LAB REQUISITION (OUTPATIENT)
Dept: LAB | Facility: CLINIC | Age: 81
End: 2021-10-04
Payer: COMMERCIAL

## 2021-10-04 DIAGNOSIS — E11.21 TYPE 2 DIABETES MELLITUS WITH DIABETIC NEPHROPATHY (H): ICD-10-CM

## 2021-10-05 LAB
ANION GAP SERPL CALCULATED.3IONS-SCNC: 13 MMOL/L (ref 5–18)
BUN SERPL-MCNC: 22 MG/DL (ref 8–28)
CALCIUM SERPL-MCNC: 10.2 MG/DL (ref 8.5–10.5)
CHLORIDE BLD-SCNC: 100 MMOL/L (ref 98–107)
CO2 SERPL-SCNC: 23 MMOL/L (ref 22–31)
CREAT SERPL-MCNC: 0.89 MG/DL (ref 0.6–1.1)
GFR SERPL CREATININE-BSD FRML MDRD: 61 ML/MIN/1.73M2
GLUCOSE BLD-MCNC: 189 MG/DL (ref 70–125)
POTASSIUM BLD-SCNC: 4.6 MMOL/L (ref 3.5–5)
SODIUM SERPL-SCNC: 136 MMOL/L (ref 136–145)

## 2021-10-05 PROCEDURE — 36415 COLL VENOUS BLD VENIPUNCTURE: CPT | Mod: ORL | Performed by: FAMILY MEDICINE

## 2021-10-05 PROCEDURE — 80048 BASIC METABOLIC PNL TOTAL CA: CPT | Mod: ORL | Performed by: FAMILY MEDICINE

## 2021-10-05 PROCEDURE — P9604 ONE-WAY ALLOW PRORATED TRIP: HCPCS | Mod: ORL | Performed by: FAMILY MEDICINE

## 2022-03-31 ENCOUNTER — LAB REQUISITION (OUTPATIENT)
Dept: LAB | Facility: CLINIC | Age: 82
End: 2022-03-31
Payer: COMMERCIAL

## 2022-03-31 DIAGNOSIS — I10 ESSENTIAL (PRIMARY) HYPERTENSION: ICD-10-CM

## 2022-04-04 LAB
ANION GAP SERPL CALCULATED.3IONS-SCNC: 11 MMOL/L (ref 5–18)
BUN SERPL-MCNC: 22 MG/DL (ref 8–28)
CALCIUM SERPL-MCNC: 9.5 MG/DL (ref 8.5–10.5)
CHLORIDE BLD-SCNC: 101 MMOL/L (ref 98–107)
CO2 SERPL-SCNC: 23 MMOL/L (ref 22–31)
CREAT SERPL-MCNC: 0.78 MG/DL (ref 0.6–1.1)
ERYTHROCYTE [DISTWIDTH] IN BLOOD BY AUTOMATED COUNT: 12.3 % (ref 10–15)
GFR SERPL CREATININE-BSD FRML MDRD: 76 ML/MIN/1.73M2
GLUCOSE BLD-MCNC: 128 MG/DL (ref 70–125)
HBA1C MFR BLD: 6 %
HCT VFR BLD AUTO: 31.4 % (ref 35–47)
HGB BLD-MCNC: 10.7 G/DL (ref 11.7–15.7)
MCH RBC QN AUTO: 31.4 PG (ref 26.5–33)
MCHC RBC AUTO-ENTMCNC: 34.1 G/DL (ref 31.5–36.5)
MCV RBC AUTO: 92 FL (ref 78–100)
PLATELET # BLD AUTO: 198 10E3/UL (ref 150–450)
POTASSIUM BLD-SCNC: 4.8 MMOL/L (ref 3.5–5)
RBC # BLD AUTO: 3.41 10E6/UL (ref 3.8–5.2)
SODIUM SERPL-SCNC: 135 MMOL/L (ref 136–145)
WBC # BLD AUTO: 5.3 10E3/UL (ref 4–11)

## 2022-04-04 PROCEDURE — P9603 ONE-WAY ALLOW PRORATED MILES: HCPCS | Mod: ORL | Performed by: FAMILY MEDICINE

## 2022-04-04 PROCEDURE — 85027 COMPLETE CBC AUTOMATED: CPT | Mod: ORL | Performed by: FAMILY MEDICINE

## 2022-04-04 PROCEDURE — 36415 COLL VENOUS BLD VENIPUNCTURE: CPT | Mod: ORL | Performed by: FAMILY MEDICINE

## 2022-04-04 PROCEDURE — 83036 HEMOGLOBIN GLYCOSYLATED A1C: CPT | Mod: ORL | Performed by: FAMILY MEDICINE

## 2022-04-04 PROCEDURE — 80048 BASIC METABOLIC PNL TOTAL CA: CPT | Mod: ORL | Performed by: FAMILY MEDICINE

## 2022-04-07 ENCOUNTER — LAB REQUISITION (OUTPATIENT)
Dept: LAB | Facility: CLINIC | Age: 82
End: 2022-04-07
Payer: COMMERCIAL

## 2022-04-07 DIAGNOSIS — E55.9 VITAMIN D DEFICIENCY, UNSPECIFIED: ICD-10-CM

## 2022-04-08 LAB
ALT SERPL W P-5'-P-CCNC: <9 U/L (ref 0–45)
AST SERPL W P-5'-P-CCNC: 12 U/L (ref 0–40)
BASOPHILS # BLD AUTO: 0.1 10E3/UL (ref 0–0.2)
BASOPHILS NFR BLD AUTO: 1 %
DEPRECATED CALCIDIOL+CALCIFEROL SERPL-MC: 49 UG/L (ref 20–75)
EOSINOPHIL # BLD AUTO: 0.1 10E3/UL (ref 0–0.7)
EOSINOPHIL NFR BLD AUTO: 3 %
ERYTHROCYTE [DISTWIDTH] IN BLOOD BY AUTOMATED COUNT: 12.2 % (ref 10–15)
FOLATE SERPL-MCNC: 6.6 NG/ML
HCT VFR BLD AUTO: 32.5 % (ref 35–47)
HGB BLD-MCNC: 11.1 G/DL (ref 11.7–15.7)
IMM GRANULOCYTES # BLD: 0.1 10E3/UL
IMM GRANULOCYTES NFR BLD: 2 %
LYMPHOCYTES # BLD AUTO: 1.5 10E3/UL (ref 0.8–5.3)
LYMPHOCYTES NFR BLD AUTO: 26 %
MCH RBC QN AUTO: 31.3 PG (ref 26.5–33)
MCHC RBC AUTO-ENTMCNC: 34.2 G/DL (ref 31.5–36.5)
MCV RBC AUTO: 92 FL (ref 78–100)
MONOCYTES # BLD AUTO: 0.6 10E3/UL (ref 0–1.3)
MONOCYTES NFR BLD AUTO: 11 %
NEUTROPHILS # BLD AUTO: 3.2 10E3/UL (ref 1.6–8.3)
NEUTROPHILS NFR BLD AUTO: 57 %
NRBC # BLD AUTO: 0 10E3/UL
NRBC BLD AUTO-RTO: 0 /100
PLATELET # BLD AUTO: 212 10E3/UL (ref 150–450)
RBC # BLD AUTO: 3.55 10E6/UL (ref 3.8–5.2)
SODIUM SERPL-SCNC: 136 MMOL/L (ref 136–145)
VALPROATE SERPL-MCNC: 62.8 UG/ML
WBC # BLD AUTO: 5.6 10E3/UL (ref 4–11)

## 2022-04-08 PROCEDURE — 84460 ALANINE AMINO (ALT) (SGPT): CPT | Performed by: PSYCHIATRY & NEUROLOGY

## 2022-04-08 PROCEDURE — 85014 HEMATOCRIT: CPT | Performed by: PSYCHIATRY & NEUROLOGY

## 2022-04-08 PROCEDURE — P9603 ONE-WAY ALLOW PRORATED MILES: HCPCS | Mod: ORL | Performed by: PSYCHIATRY & NEUROLOGY

## 2022-04-08 PROCEDURE — 82746 ASSAY OF FOLIC ACID SERUM: CPT | Performed by: PSYCHIATRY & NEUROLOGY

## 2022-04-08 PROCEDURE — 84295 ASSAY OF SERUM SODIUM: CPT | Performed by: PSYCHIATRY & NEUROLOGY

## 2022-04-08 PROCEDURE — 82306 VITAMIN D 25 HYDROXY: CPT | Mod: ORL | Performed by: PSYCHIATRY & NEUROLOGY

## 2022-04-08 PROCEDURE — 80164 ASSAY DIPROPYLACETIC ACD TOT: CPT | Mod: ORL | Performed by: PSYCHIATRY & NEUROLOGY

## 2022-04-08 PROCEDURE — 84450 TRANSFERASE (AST) (SGOT): CPT | Performed by: PSYCHIATRY & NEUROLOGY

## 2022-04-08 PROCEDURE — 36415 COLL VENOUS BLD VENIPUNCTURE: CPT | Performed by: PSYCHIATRY & NEUROLOGY

## 2022-04-25 ENCOUNTER — LAB REQUISITION (OUTPATIENT)
Dept: LAB | Facility: CLINIC | Age: 82
End: 2022-04-25
Payer: COMMERCIAL

## 2022-04-25 DIAGNOSIS — Z11.52 ENCOUNTER FOR SCREENING FOR COVID-19: ICD-10-CM

## 2022-04-25 PROCEDURE — U0005 INFEC AGEN DETEC AMPLI PROBE: HCPCS | Mod: ORL | Performed by: FAMILY MEDICINE

## 2022-04-26 LAB — SARS-COV-2 RNA RESP QL NAA+PROBE: NEGATIVE

## 2022-05-02 ENCOUNTER — LAB REQUISITION (OUTPATIENT)
Dept: LAB | Facility: CLINIC | Age: 82
End: 2022-05-02
Payer: COMMERCIAL

## 2022-05-02 DIAGNOSIS — Z11.59 ENCOUNTER FOR SCREENING FOR OTHER VIRAL DISEASES: ICD-10-CM

## 2022-05-20 ENCOUNTER — LAB REQUISITION (OUTPATIENT)
Dept: LAB | Facility: CLINIC | Age: 82
End: 2022-05-20
Payer: COMMERCIAL

## 2022-05-20 DIAGNOSIS — I10 ESSENTIAL (PRIMARY) HYPERTENSION: ICD-10-CM

## 2022-05-20 DIAGNOSIS — F25.9 SCHIZOAFFECTIVE DISORDER, UNSPECIFIED (H): ICD-10-CM

## 2022-05-20 DIAGNOSIS — E11.21 TYPE 2 DIABETES MELLITUS WITH DIABETIC NEPHROPATHY (H): ICD-10-CM

## 2022-05-20 DIAGNOSIS — E11.51 TYPE 2 DIABETES MELLITUS WITH DIABETIC PERIPHERAL ANGIOPATHY WITHOUT GANGRENE (H): ICD-10-CM

## 2022-05-21 PROCEDURE — U0003 INFECTIOUS AGENT DETECTION BY NUCLEIC ACID (DNA OR RNA); SEVERE ACUTE RESPIRATORY SYNDROME CORONAVIRUS 2 (SARS-COV-2) (CORONAVIRUS DISEASE [COVID-19]), AMPLIFIED PROBE TECHNIQUE, MAKING USE OF HIGH THROUGHPUT TECHNOLOGIES AS DESCRIBED BY CMS-2020-01-R: HCPCS | Mod: ORL | Performed by: FAMILY MEDICINE

## 2022-05-23 LAB
ANION GAP SERPL CALCULATED.3IONS-SCNC: 8 MMOL/L (ref 5–18)
BUN SERPL-MCNC: 26 MG/DL (ref 8–28)
CALCIUM SERPL-MCNC: 9.5 MG/DL (ref 8.5–10.5)
CHLORIDE BLD-SCNC: 103 MMOL/L (ref 98–107)
CO2 SERPL-SCNC: 25 MMOL/L (ref 22–31)
CREAT SERPL-MCNC: 0.87 MG/DL (ref 0.6–1.1)
ERYTHROCYTE [DISTWIDTH] IN BLOOD BY AUTOMATED COUNT: 12.8 % (ref 10–15)
GFR SERPL CREATININE-BSD FRML MDRD: 67 ML/MIN/1.73M2
GLUCOSE BLD-MCNC: 114 MG/DL (ref 70–125)
HCT VFR BLD AUTO: 31.1 % (ref 35–47)
HGB BLD-MCNC: 10.2 G/DL (ref 11.7–15.7)
MCH RBC QN AUTO: 30.5 PG (ref 26.5–33)
MCHC RBC AUTO-ENTMCNC: 32.8 G/DL (ref 31.5–36.5)
MCV RBC AUTO: 93 FL (ref 78–100)
PLATELET # BLD AUTO: 224 10E3/UL (ref 150–450)
POTASSIUM BLD-SCNC: 5.2 MMOL/L (ref 3.5–5)
RBC # BLD AUTO: 3.34 10E6/UL (ref 3.8–5.2)
SODIUM SERPL-SCNC: 136 MMOL/L (ref 136–145)
WBC # BLD AUTO: 5.1 10E3/UL (ref 4–11)

## 2022-05-23 PROCEDURE — P9603 ONE-WAY ALLOW PRORATED MILES: HCPCS | Mod: ORL | Performed by: FAMILY MEDICINE

## 2022-05-23 PROCEDURE — 85027 COMPLETE CBC AUTOMATED: CPT | Mod: ORL | Performed by: FAMILY MEDICINE

## 2022-05-23 PROCEDURE — 80048 BASIC METABOLIC PNL TOTAL CA: CPT | Mod: ORL | Performed by: FAMILY MEDICINE

## 2022-05-23 PROCEDURE — 36415 COLL VENOUS BLD VENIPUNCTURE: CPT | Mod: ORL | Performed by: FAMILY MEDICINE

## 2022-05-28 ENCOUNTER — LAB REQUISITION (OUTPATIENT)
Dept: LAB | Facility: CLINIC | Age: 82
End: 2022-05-28
Payer: COMMERCIAL

## 2022-05-28 DIAGNOSIS — U07.1 COVID-19: ICD-10-CM

## 2022-05-29 LAB — SARS-COV-2 RNA RESP QL NAA+PROBE: NEGATIVE

## 2022-07-05 ENCOUNTER — LAB REQUISITION (OUTPATIENT)
Dept: LAB | Facility: CLINIC | Age: 82
End: 2022-07-05
Payer: COMMERCIAL

## 2022-07-05 DIAGNOSIS — E87.6 HYPOKALEMIA: ICD-10-CM

## 2022-07-06 LAB
ANION GAP SERPL CALCULATED.3IONS-SCNC: 12 MMOL/L (ref 7–15)
BUN SERPL-MCNC: 28.9 MG/DL (ref 8–23)
CALCIUM SERPL-MCNC: 9.4 MG/DL (ref 8.8–10.2)
CHLORIDE SERPL-SCNC: 100 MMOL/L (ref 98–107)
CREAT SERPL-MCNC: 0.97 MG/DL (ref 0.51–0.95)
DEPRECATED HCO3 PLAS-SCNC: 23 MMOL/L (ref 22–29)
GFR SERPL CREATININE-BSD FRML MDRD: 58 ML/MIN/1.73M2
GLUCOSE SERPL-MCNC: 97 MG/DL (ref 70–99)
POTASSIUM SERPL-SCNC: 4.8 MMOL/L (ref 3.4–5.3)
SODIUM SERPL-SCNC: 135 MMOL/L (ref 136–145)

## 2022-07-06 PROCEDURE — 36415 COLL VENOUS BLD VENIPUNCTURE: CPT | Mod: ORL | Performed by: FAMILY MEDICINE

## 2022-07-06 PROCEDURE — P9603 ONE-WAY ALLOW PRORATED MILES: HCPCS | Mod: ORL | Performed by: FAMILY MEDICINE

## 2022-07-06 PROCEDURE — 80048 BASIC METABOLIC PNL TOTAL CA: CPT | Mod: ORL | Performed by: FAMILY MEDICINE

## 2022-08-22 ENCOUNTER — LAB REQUISITION (OUTPATIENT)
Dept: LAB | Facility: CLINIC | Age: 82
End: 2022-08-22
Payer: COMMERCIAL

## 2022-08-22 DIAGNOSIS — I10 ESSENTIAL (PRIMARY) HYPERTENSION: ICD-10-CM

## 2022-08-22 DIAGNOSIS — F25.9 SCHIZOAFFECTIVE DISORDER, UNSPECIFIED (H): ICD-10-CM

## 2022-08-22 DIAGNOSIS — E11.51 TYPE 2 DIABETES MELLITUS WITH DIABETIC PERIPHERAL ANGIOPATHY WITHOUT GANGRENE (H): ICD-10-CM

## 2022-08-22 DIAGNOSIS — E11.21 TYPE 2 DIABETES MELLITUS WITH DIABETIC NEPHROPATHY (H): ICD-10-CM

## 2022-08-25 LAB — HBA1C MFR BLD: 6.3 %

## 2022-08-25 PROCEDURE — 36415 COLL VENOUS BLD VENIPUNCTURE: CPT | Mod: ORL | Performed by: FAMILY MEDICINE

## 2022-08-25 PROCEDURE — P9604 ONE-WAY ALLOW PRORATED TRIP: HCPCS | Mod: ORL | Performed by: FAMILY MEDICINE

## 2022-08-25 PROCEDURE — 83036 HEMOGLOBIN GLYCOSYLATED A1C: CPT | Mod: ORL | Performed by: FAMILY MEDICINE

## 2022-09-07 ENCOUNTER — HOSPITAL ENCOUNTER (OUTPATIENT)
Dept: MAMMOGRAPHY | Facility: CLINIC | Age: 82
Discharge: HOME | End: 2022-09-07
Attending: FAMILY MEDICINE | Admitting: FAMILY MEDICINE
Payer: COMMERCIAL

## 2022-09-07 DIAGNOSIS — Z12.31 VISIT FOR SCREENING MAMMOGRAM: ICD-10-CM

## 2022-09-07 PROCEDURE — 77067 SCR MAMMO BI INCL CAD: CPT

## 2022-10-23 ENCOUNTER — HEALTH MAINTENANCE LETTER (OUTPATIENT)
Age: 82
End: 2022-10-23

## 2022-11-28 ENCOUNTER — LAB REQUISITION (OUTPATIENT)
Dept: LAB | Facility: CLINIC | Age: 82
End: 2022-11-28
Payer: COMMERCIAL

## 2022-11-28 DIAGNOSIS — Z11.52 ENCOUNTER FOR SCREENING FOR COVID-19: ICD-10-CM

## 2022-11-28 LAB — SARS-COV-2 RNA RESP QL NAA+PROBE: NEGATIVE

## 2022-11-28 PROCEDURE — U0003 INFECTIOUS AGENT DETECTION BY NUCLEIC ACID (DNA OR RNA); SEVERE ACUTE RESPIRATORY SYNDROME CORONAVIRUS 2 (SARS-COV-2) (CORONAVIRUS DISEASE [COVID-19]), AMPLIFIED PROBE TECHNIQUE, MAKING USE OF HIGH THROUGHPUT TECHNOLOGIES AS DESCRIBED BY CMS-2020-01-R: HCPCS | Mod: ORL | Performed by: FAMILY MEDICINE

## 2022-12-01 ENCOUNTER — LAB REQUISITION (OUTPATIENT)
Dept: LAB | Facility: CLINIC | Age: 82
End: 2022-12-01
Payer: COMMERCIAL

## 2022-12-01 DIAGNOSIS — Z79.899 OTHER LONG TERM (CURRENT) DRUG THERAPY: ICD-10-CM

## 2022-12-02 LAB
ALBUMIN SERPL BCG-MCNC: 4 G/DL (ref 3.5–5.2)
ALP SERPL-CCNC: 75 U/L (ref 35–104)
ALT SERPL W P-5'-P-CCNC: 6 U/L (ref 10–35)
AST SERPL W P-5'-P-CCNC: 18 U/L (ref 10–35)
BILIRUB DIRECT SERPL-MCNC: <0.2 MG/DL (ref 0–0.3)
BILIRUB SERPL-MCNC: 0.2 MG/DL
ERYTHROCYTE [DISTWIDTH] IN BLOOD BY AUTOMATED COUNT: 13.1 % (ref 10–15)
HCT VFR BLD AUTO: 37.1 % (ref 35–47)
HGB BLD-MCNC: 11.9 G/DL (ref 11.7–15.7)
MCH RBC QN AUTO: 29.9 PG (ref 26.5–33)
MCHC RBC AUTO-ENTMCNC: 32.1 G/DL (ref 31.5–36.5)
MCV RBC AUTO: 93 FL (ref 78–100)
PLATELET # BLD AUTO: 221 10E3/UL (ref 150–450)
PROT SERPL-MCNC: 6.8 G/DL (ref 6.4–8.3)
RBC # BLD AUTO: 3.98 10E6/UL (ref 3.8–5.2)
WBC # BLD AUTO: 6.7 10E3/UL (ref 4–11)

## 2022-12-02 PROCEDURE — 80076 HEPATIC FUNCTION PANEL: CPT | Mod: ORL | Performed by: FAMILY MEDICINE

## 2022-12-02 PROCEDURE — 85027 COMPLETE CBC AUTOMATED: CPT | Mod: ORL | Performed by: FAMILY MEDICINE

## 2022-12-02 PROCEDURE — P9604 ONE-WAY ALLOW PRORATED TRIP: HCPCS | Mod: ORL | Performed by: FAMILY MEDICINE

## 2022-12-02 PROCEDURE — 36415 COLL VENOUS BLD VENIPUNCTURE: CPT | Mod: ORL | Performed by: FAMILY MEDICINE

## 2022-12-10 ENCOUNTER — HEALTH MAINTENANCE LETTER (OUTPATIENT)
Age: 82
End: 2022-12-10

## 2023-01-19 ENCOUNTER — LAB REQUISITION (OUTPATIENT)
Dept: LAB | Facility: CLINIC | Age: 83
End: 2023-01-19
Payer: COMMERCIAL

## 2023-01-19 DIAGNOSIS — F25.0 SCHIZOAFFECTIVE DISORDER, BIPOLAR TYPE (H): ICD-10-CM

## 2023-01-19 DIAGNOSIS — I10 ESSENTIAL (PRIMARY) HYPERTENSION: ICD-10-CM

## 2023-01-20 LAB — VALPROATE SERPL-MCNC: 68.6 UG/ML

## 2023-01-20 PROCEDURE — 80164 ASSAY DIPROPYLACETIC ACD TOT: CPT | Mod: ORL | Performed by: FAMILY MEDICINE

## 2023-01-20 PROCEDURE — 36415 COLL VENOUS BLD VENIPUNCTURE: CPT | Mod: ORL | Performed by: FAMILY MEDICINE

## 2023-01-20 PROCEDURE — P9604 ONE-WAY ALLOW PRORATED TRIP: HCPCS | Mod: ORL | Performed by: FAMILY MEDICINE

## 2023-03-22 ENCOUNTER — LAB REQUISITION (OUTPATIENT)
Dept: LAB | Facility: CLINIC | Age: 83
End: 2023-03-22
Payer: COMMERCIAL

## 2023-03-22 DIAGNOSIS — E11.21 TYPE 2 DIABETES MELLITUS WITH DIABETIC NEPHROPATHY (H): ICD-10-CM

## 2023-03-22 DIAGNOSIS — N18.9 CHRONIC KIDNEY DISEASE, UNSPECIFIED: ICD-10-CM

## 2023-03-23 LAB
ANION GAP SERPL CALCULATED.3IONS-SCNC: 13 MMOL/L (ref 7–15)
BUN SERPL-MCNC: 29.9 MG/DL (ref 8–23)
CALCIUM SERPL-MCNC: 9.9 MG/DL (ref 8.8–10.2)
CHLORIDE SERPL-SCNC: 100 MMOL/L (ref 98–107)
CREAT SERPL-MCNC: 0.96 MG/DL (ref 0.51–0.95)
DEPRECATED HCO3 PLAS-SCNC: 22 MMOL/L (ref 22–29)
GFR SERPL CREATININE-BSD FRML MDRD: 59 ML/MIN/1.73M2
GLUCOSE SERPL-MCNC: 187 MG/DL (ref 70–99)
HBA1C MFR BLD: 6.5 %
POTASSIUM SERPL-SCNC: 5 MMOL/L (ref 3.4–5.3)
SODIUM SERPL-SCNC: 135 MMOL/L (ref 136–145)

## 2023-03-23 PROCEDURE — 83036 HEMOGLOBIN GLYCOSYLATED A1C: CPT | Mod: ORL | Performed by: FAMILY MEDICINE

## 2023-03-23 PROCEDURE — P9604 ONE-WAY ALLOW PRORATED TRIP: HCPCS | Mod: ORL | Performed by: FAMILY MEDICINE

## 2023-03-23 PROCEDURE — 36415 COLL VENOUS BLD VENIPUNCTURE: CPT | Mod: ORL | Performed by: FAMILY MEDICINE

## 2023-03-23 PROCEDURE — 80048 BASIC METABOLIC PNL TOTAL CA: CPT | Mod: ORL | Performed by: FAMILY MEDICINE

## 2023-08-27 ENCOUNTER — APPOINTMENT (OUTPATIENT)
Dept: GENERAL RADIOLOGY | Facility: CLINIC | Age: 83
DRG: 872 | End: 2023-08-27
Attending: EMERGENCY MEDICINE
Payer: COMMERCIAL

## 2023-08-27 ENCOUNTER — HEALTH MAINTENANCE LETTER (OUTPATIENT)
Age: 83
End: 2023-08-27

## 2023-08-27 ENCOUNTER — HOSPITAL ENCOUNTER (INPATIENT)
Facility: CLINIC | Age: 83
LOS: 4 days | Discharge: NURSING, REHAB OR LONG-TERM CARE | DRG: 872 | End: 2023-08-31
Attending: EMERGENCY MEDICINE | Admitting: INTERNAL MEDICINE
Payer: COMMERCIAL

## 2023-08-27 DIAGNOSIS — Z98.818 OTHER DENTAL PROCEDURE STATUS: Primary | ICD-10-CM

## 2023-08-27 DIAGNOSIS — N39.0 ACUTE UTI: ICD-10-CM

## 2023-08-27 PROBLEM — B37.9 CANDIDIASIS: Status: ACTIVE | Noted: 2018-03-06

## 2023-08-27 PROBLEM — J30.0 VASOMOTOR RHINITIS: Status: ACTIVE | Noted: 2017-10-10

## 2023-08-27 PROBLEM — H25.11 NUCLEAR SENILE CATARACT OF RIGHT EYE: Status: ACTIVE | Noted: 2021-04-05

## 2023-08-27 PROBLEM — H25.812 COMBINED FORMS OF AGE-RELATED CATARACT OF LEFT EYE: Status: ACTIVE | Noted: 2021-04-05

## 2023-08-27 LAB
ALBUMIN SERPL BCG-MCNC: 3.7 G/DL (ref 3.5–5.2)
ALBUMIN UR-MCNC: 300 MG/DL
ALP SERPL-CCNC: 81 U/L (ref 35–104)
ALT SERPL W P-5'-P-CCNC: 7 U/L (ref 0–50)
ANION GAP SERPL CALCULATED.3IONS-SCNC: 11 MMOL/L (ref 7–15)
APPEARANCE UR: ABNORMAL
AST SERPL W P-5'-P-CCNC: 18 U/L (ref 0–45)
BASOPHILS # BLD AUTO: 0 10E3/UL (ref 0–0.2)
BASOPHILS NFR BLD AUTO: 0 %
BILIRUB SERPL-MCNC: 0.3 MG/DL
BILIRUB UR QL STRIP: NEGATIVE
BUN SERPL-MCNC: 25.7 MG/DL (ref 8–23)
CALCIUM SERPL-MCNC: 9.6 MG/DL (ref 8.8–10.2)
CHLORIDE SERPL-SCNC: 95 MMOL/L (ref 98–107)
COLOR UR AUTO: YELLOW
CREAT SERPL-MCNC: 1.06 MG/DL (ref 0.51–0.95)
DEPRECATED HCO3 PLAS-SCNC: 23 MMOL/L (ref 22–29)
EOSINOPHIL # BLD AUTO: 0 10E3/UL (ref 0–0.7)
EOSINOPHIL NFR BLD AUTO: 0 %
ERYTHROCYTE [DISTWIDTH] IN BLOOD BY AUTOMATED COUNT: 13.5 % (ref 10–15)
FLUAV RNA SPEC QL NAA+PROBE: NEGATIVE
FLUBV RNA RESP QL NAA+PROBE: NEGATIVE
GFR SERPL CREATININE-BSD FRML MDRD: 52 ML/MIN/1.73M2
GLUCOSE BLDC GLUCOMTR-MCNC: 215 MG/DL (ref 70–99)
GLUCOSE SERPL-MCNC: 232 MG/DL (ref 70–99)
GLUCOSE UR STRIP-MCNC: NEGATIVE MG/DL
HCO3 BLDV-SCNC: 23 MMOL/L (ref 21–28)
HCT VFR BLD AUTO: 33.1 % (ref 35–47)
HGB BLD-MCNC: 10.9 G/DL (ref 11.7–15.7)
HGB UR QL STRIP: ABNORMAL
HOLD SPECIMEN: NORMAL
IMM GRANULOCYTES # BLD: 0.1 10E3/UL
IMM GRANULOCYTES NFR BLD: 2 %
INR PPP: 1.12 (ref 0.85–1.15)
KETONES UR STRIP-MCNC: NEGATIVE MG/DL
LACTATE BLD-SCNC: 1.5 MMOL/L
LACTATE SERPL-SCNC: 1.8 MMOL/L (ref 0.7–2)
LEUKOCYTE ESTERASE UR QL STRIP: ABNORMAL
LYMPHOCYTES # BLD AUTO: 0.4 10E3/UL (ref 0.8–5.3)
LYMPHOCYTES NFR BLD AUTO: 5 %
MAGNESIUM SERPL-MCNC: 1.6 MG/DL (ref 1.7–2.3)
MCH RBC QN AUTO: 29.5 PG (ref 26.5–33)
MCHC RBC AUTO-ENTMCNC: 32.9 G/DL (ref 31.5–36.5)
MCV RBC AUTO: 90 FL (ref 78–100)
MONOCYTES # BLD AUTO: 0.4 10E3/UL (ref 0–1.3)
MONOCYTES NFR BLD AUTO: 5 %
MUCOUS THREADS #/AREA URNS LPF: PRESENT /LPF
NEUTROPHILS # BLD AUTO: 7.4 10E3/UL (ref 1.6–8.3)
NEUTROPHILS NFR BLD AUTO: 88 %
NITRATE UR QL: POSITIVE
NRBC # BLD AUTO: 0 10E3/UL
NRBC BLD AUTO-RTO: 0 /100
PCO2 BLDV: 38 MM HG (ref 40–50)
PH BLDV: 7.39 [PH] (ref 7.32–7.43)
PH UR STRIP: 6 [PH] (ref 5–7)
PLATELET # BLD AUTO: 187 10E3/UL (ref 150–450)
PO2 BLDV: 20 MM HG (ref 25–47)
POTASSIUM SERPL-SCNC: 4.6 MMOL/L (ref 3.4–5.3)
PROT SERPL-MCNC: 6.9 G/DL (ref 6.4–8.3)
RBC # BLD AUTO: 3.69 10E6/UL (ref 3.8–5.2)
RBC URINE: 106 /HPF
RSV RNA SPEC NAA+PROBE: NEGATIVE
SAO2 % BLDV: 33 % (ref 94–100)
SARS-COV-2 RNA RESP QL NAA+PROBE: NEGATIVE
SODIUM SERPL-SCNC: 129 MMOL/L (ref 136–145)
SP GR UR STRIP: 1.01 (ref 1–1.03)
SQUAMOUS EPITHELIAL: 2 /HPF
UROBILINOGEN UR STRIP-MCNC: NORMAL MG/DL
WBC # BLD AUTO: 8.4 10E3/UL (ref 4–11)
WBC CLUMPS #/AREA URNS HPF: PRESENT /HPF
WBC URINE: >182 /HPF

## 2023-08-27 PROCEDURE — 96361 HYDRATE IV INFUSION ADD-ON: CPT

## 2023-08-27 PROCEDURE — 250N000013 HC RX MED GY IP 250 OP 250 PS 637: Performed by: PHYSICIAN ASSISTANT

## 2023-08-27 PROCEDURE — 87637 SARSCOV2&INF A&B&RSV AMP PRB: CPT | Performed by: EMERGENCY MEDICINE

## 2023-08-27 PROCEDURE — 73110 X-RAY EXAM OF WRIST: CPT | Mod: RT

## 2023-08-27 PROCEDURE — 258N000003 HC RX IP 258 OP 636: Performed by: EMERGENCY MEDICINE

## 2023-08-27 PROCEDURE — 71046 X-RAY EXAM CHEST 2 VIEWS: CPT

## 2023-08-27 PROCEDURE — 36415 COLL VENOUS BLD VENIPUNCTURE: CPT | Performed by: EMERGENCY MEDICINE

## 2023-08-27 PROCEDURE — 81001 URINALYSIS AUTO W/SCOPE: CPT | Performed by: EMERGENCY MEDICINE

## 2023-08-27 PROCEDURE — 83605 ASSAY OF LACTIC ACID: CPT

## 2023-08-27 PROCEDURE — 120N000001 HC R&B MED SURG/OB

## 2023-08-27 PROCEDURE — 73090 X-RAY EXAM OF FOREARM: CPT | Mod: RT

## 2023-08-27 PROCEDURE — 250N000011 HC RX IP 250 OP 636: Mod: JZ | Performed by: EMERGENCY MEDICINE

## 2023-08-27 PROCEDURE — 94660 CPAP INITIATION&MGMT: CPT

## 2023-08-27 PROCEDURE — 96365 THER/PROPH/DIAG IV INF INIT: CPT

## 2023-08-27 PROCEDURE — 83605 ASSAY OF LACTIC ACID: CPT | Performed by: EMERGENCY MEDICINE

## 2023-08-27 PROCEDURE — 999N000157 HC STATISTIC RCP TIME EA 10 MIN

## 2023-08-27 PROCEDURE — 85610 PROTHROMBIN TIME: CPT | Performed by: EMERGENCY MEDICINE

## 2023-08-27 PROCEDURE — 80053 COMPREHEN METABOLIC PANEL: CPT | Performed by: EMERGENCY MEDICINE

## 2023-08-27 PROCEDURE — 83735 ASSAY OF MAGNESIUM: CPT | Performed by: PHYSICIAN ASSISTANT

## 2023-08-27 PROCEDURE — 99223 1ST HOSP IP/OBS HIGH 75: CPT | Performed by: PHYSICIAN ASSISTANT

## 2023-08-27 PROCEDURE — 82803 BLOOD GASES ANY COMBINATION: CPT

## 2023-08-27 PROCEDURE — 99285 EMERGENCY DEPT VISIT HI MDM: CPT | Mod: 25

## 2023-08-27 PROCEDURE — 258N000003 HC RX IP 258 OP 636: Performed by: PHYSICIAN ASSISTANT

## 2023-08-27 PROCEDURE — 85025 COMPLETE CBC W/AUTO DIFF WBC: CPT | Performed by: EMERGENCY MEDICINE

## 2023-08-27 PROCEDURE — 87186 SC STD MICRODIL/AGAR DIL: CPT | Performed by: EMERGENCY MEDICINE

## 2023-08-27 PROCEDURE — 250N000013 HC RX MED GY IP 250 OP 250 PS 637: Performed by: EMERGENCY MEDICINE

## 2023-08-27 PROCEDURE — 87040 BLOOD CULTURE FOR BACTERIA: CPT | Performed by: EMERGENCY MEDICINE

## 2023-08-27 RX ORDER — DEXTROSE MONOHYDRATE 25 G/50ML
25-50 INJECTION, SOLUTION INTRAVENOUS
Status: DISCONTINUED | OUTPATIENT
Start: 2023-08-27 | End: 2023-08-31 | Stop reason: HOSPADM

## 2023-08-27 RX ORDER — POLYETHYLENE GLYCOL 3350 17 G/17G
17 POWDER, FOR SOLUTION ORAL DAILY
Status: DISCONTINUED | OUTPATIENT
Start: 2023-08-28 | End: 2023-08-31 | Stop reason: HOSPADM

## 2023-08-27 RX ORDER — CEFTRIAXONE 1 G/1
2 INJECTION, POWDER, FOR SOLUTION INTRAMUSCULAR; INTRAVENOUS ONCE
Status: COMPLETED | OUTPATIENT
Start: 2023-08-27 | End: 2023-08-27

## 2023-08-27 RX ORDER — ASPIRIN 81 MG/1
81 TABLET ORAL EVERY OTHER DAY
Status: DISCONTINUED | OUTPATIENT
Start: 2023-08-27 | End: 2023-08-31 | Stop reason: HOSPADM

## 2023-08-27 RX ORDER — SODIUM CHLORIDE 9 MG/ML
INJECTION, SOLUTION INTRAVENOUS CONTINUOUS
Status: DISCONTINUED | OUTPATIENT
Start: 2023-08-27 | End: 2023-08-29

## 2023-08-27 RX ORDER — IBUPROFEN 600 MG/1
600 TABLET, FILM COATED ORAL ONCE
Status: COMPLETED | OUTPATIENT
Start: 2023-08-27 | End: 2023-08-27

## 2023-08-27 RX ORDER — NICOTINE POLACRILEX 4 MG
15-30 LOZENGE BUCCAL
Status: DISCONTINUED | OUTPATIENT
Start: 2023-08-27 | End: 2023-08-31 | Stop reason: HOSPADM

## 2023-08-27 RX ORDER — FLUTICASONE FUROATE AND VILANTEROL 100; 25 UG/1; UG/1
1 POWDER RESPIRATORY (INHALATION) DAILY
Status: DISCONTINUED | OUTPATIENT
Start: 2023-08-28 | End: 2023-08-31 | Stop reason: HOSPADM

## 2023-08-27 RX ORDER — DIVALPROEX SODIUM 250 MG/1
250 TABLET, EXTENDED RELEASE ORAL EVERY EVENING
Status: DISCONTINUED | OUTPATIENT
Start: 2023-08-27 | End: 2023-08-31 | Stop reason: HOSPADM

## 2023-08-27 RX ORDER — DIVALPROEX SODIUM 500 MG/1
1000 TABLET, EXTENDED RELEASE ORAL EVERY EVENING
Status: DISCONTINUED | OUTPATIENT
Start: 2023-08-27 | End: 2023-08-31 | Stop reason: HOSPADM

## 2023-08-27 RX ORDER — BUPROPION HYDROCHLORIDE 100 MG/1
100 TABLET, EXTENDED RELEASE ORAL DAILY
Status: DISCONTINUED | OUTPATIENT
Start: 2023-08-28 | End: 2023-08-31 | Stop reason: HOSPADM

## 2023-08-27 RX ORDER — ONDANSETRON 2 MG/ML
4 INJECTION INTRAMUSCULAR; INTRAVENOUS EVERY 6 HOURS PRN
Status: DISCONTINUED | OUTPATIENT
Start: 2023-08-27 | End: 2023-08-31 | Stop reason: HOSPADM

## 2023-08-27 RX ORDER — SERTRALINE HYDROCHLORIDE 100 MG/1
100 TABLET, FILM COATED ORAL
COMMUNITY

## 2023-08-27 RX ORDER — TEMAZEPAM 15 MG/1
15 CAPSULE ORAL EVERY EVENING
Status: DISCONTINUED | OUTPATIENT
Start: 2023-08-27 | End: 2023-08-31 | Stop reason: HOSPADM

## 2023-08-27 RX ORDER — TEMAZEPAM 15 MG/1
15 CAPSULE ORAL EVERY EVENING
COMMUNITY

## 2023-08-27 RX ORDER — PANTOPRAZOLE SODIUM 40 MG/1
40 TABLET, DELAYED RELEASE ORAL DAILY
Status: DISCONTINUED | OUTPATIENT
Start: 2023-08-28 | End: 2023-08-31 | Stop reason: HOSPADM

## 2023-08-27 RX ORDER — BUPROPION HYDROCHLORIDE 100 MG/1
100 TABLET, EXTENDED RELEASE ORAL DAILY
COMMUNITY

## 2023-08-27 RX ORDER — AMOXICILLIN 250 MG
3 CAPSULE ORAL EVERY EVENING
Status: DISCONTINUED | OUTPATIENT
Start: 2023-08-27 | End: 2023-08-31 | Stop reason: HOSPADM

## 2023-08-27 RX ORDER — SERTRALINE HYDROCHLORIDE 100 MG/1
200 TABLET, FILM COATED ORAL EVERY MORNING
Status: DISCONTINUED | OUTPATIENT
Start: 2023-08-28 | End: 2023-08-31 | Stop reason: HOSPADM

## 2023-08-27 RX ORDER — AMOXICILLIN 250 MG
2 CAPSULE ORAL 2 TIMES DAILY PRN
Status: DISCONTINUED | OUTPATIENT
Start: 2023-08-27 | End: 2023-08-31 | Stop reason: HOSPADM

## 2023-08-27 RX ORDER — CEFTRIAXONE 1 G/1
2 INJECTION, POWDER, FOR SOLUTION INTRAMUSCULAR; INTRAVENOUS EVERY 24 HOURS
Status: DISCONTINUED | OUTPATIENT
Start: 2023-08-28 | End: 2023-08-30

## 2023-08-27 RX ORDER — ACETAMINOPHEN 325 MG/1
975 TABLET ORAL ONCE
Status: COMPLETED | OUTPATIENT
Start: 2023-08-27 | End: 2023-08-27

## 2023-08-27 RX ORDER — PROPRANOLOL HYDROCHLORIDE 40 MG/1
40 TABLET ORAL 3 TIMES DAILY
COMMUNITY

## 2023-08-27 RX ORDER — AMOXICILLIN 250 MG
3 CAPSULE ORAL EVERY EVENING
COMMUNITY

## 2023-08-27 RX ORDER — AMOXICILLIN 250 MG
1 CAPSULE ORAL 2 TIMES DAILY PRN
COMMUNITY

## 2023-08-27 RX ORDER — LISINOPRIL 20 MG/1
20 TABLET ORAL DAILY
COMMUNITY

## 2023-08-27 RX ORDER — SERTRALINE HYDROCHLORIDE 100 MG/1
100 TABLET, FILM COATED ORAL DAILY
Status: DISCONTINUED | OUTPATIENT
Start: 2023-08-28 | End: 2023-08-31 | Stop reason: HOSPADM

## 2023-08-27 RX ORDER — BUPROPION HYDROCHLORIDE 150 MG/1
150 TABLET, EXTENDED RELEASE ORAL DAILY
COMMUNITY
End: 2023-08-27

## 2023-08-27 RX ORDER — ONDANSETRON 4 MG/1
4 TABLET, ORALLY DISINTEGRATING ORAL EVERY 6 HOURS PRN
Status: DISCONTINUED | OUTPATIENT
Start: 2023-08-27 | End: 2023-08-31 | Stop reason: HOSPADM

## 2023-08-27 RX ORDER — ALBUTEROL SULFATE 0.83 MG/ML
2.5 SOLUTION RESPIRATORY (INHALATION)
Status: DISCONTINUED | OUTPATIENT
Start: 2023-08-27 | End: 2023-08-31 | Stop reason: HOSPADM

## 2023-08-27 RX ORDER — ACETAMINOPHEN 325 MG/1
650 TABLET ORAL EVERY 6 HOURS PRN
Status: DISCONTINUED | OUTPATIENT
Start: 2023-08-27 | End: 2023-08-31 | Stop reason: HOSPADM

## 2023-08-27 RX ORDER — FLUTICASONE PROPIONATE AND SALMETEROL XINAFOATE 115; 21 UG/1; UG/1
1 AEROSOL, METERED RESPIRATORY (INHALATION) 2 TIMES DAILY
COMMUNITY

## 2023-08-27 RX ORDER — AMOXICILLIN 250 MG
1 CAPSULE ORAL 2 TIMES DAILY PRN
Status: DISCONTINUED | OUTPATIENT
Start: 2023-08-27 | End: 2023-08-31 | Stop reason: HOSPADM

## 2023-08-27 RX ORDER — SIMVASTATIN 10 MG
10 TABLET ORAL EVERY EVENING
Status: DISCONTINUED | OUTPATIENT
Start: 2023-08-27 | End: 2023-08-31 | Stop reason: HOSPADM

## 2023-08-27 RX ORDER — ARIPIPRAZOLE 10 MG/1
30 TABLET ORAL DAILY
Status: DISCONTINUED | OUTPATIENT
Start: 2023-08-27 | End: 2023-08-31 | Stop reason: HOSPADM

## 2023-08-27 RX ORDER — LIDOCAINE 40 MG/G
CREAM TOPICAL
Status: DISCONTINUED | OUTPATIENT
Start: 2023-08-27 | End: 2023-08-31 | Stop reason: HOSPADM

## 2023-08-27 RX ADMIN — SIMVASTATIN 10 MG: 10 TABLET, FILM COATED ORAL at 21:29

## 2023-08-27 RX ADMIN — IBUPROFEN 600 MG: 600 TABLET ORAL at 19:31

## 2023-08-27 RX ADMIN — CEFTRIAXONE 2 G: 1 INJECTION, POWDER, FOR SOLUTION INTRAMUSCULAR; INTRAVENOUS at 17:45

## 2023-08-27 RX ADMIN — ASPIRIN 81 MG: 81 TABLET, COATED ORAL at 21:29

## 2023-08-27 RX ADMIN — ACETAMINOPHEN 975 MG: 325 TABLET, FILM COATED ORAL at 17:00

## 2023-08-27 RX ADMIN — ARIPIPRAZOLE 30 MG: 10 TABLET ORAL at 21:28

## 2023-08-27 RX ADMIN — SODIUM CHLORIDE 1000 ML: 9 INJECTION, SOLUTION INTRAVENOUS at 17:00

## 2023-08-27 RX ADMIN — DIVALPROEX SODIUM 250 MG: 250 TABLET, FILM COATED, EXTENDED RELEASE ORAL at 21:30

## 2023-08-27 RX ADMIN — DIVALPROEX SODIUM 1000 MG: 500 TABLET, FILM COATED, EXTENDED RELEASE ORAL at 21:30

## 2023-08-27 RX ADMIN — SODIUM CHLORIDE: 9 INJECTION, SOLUTION INTRAVENOUS at 21:09

## 2023-08-27 RX ADMIN — TEMAZEPAM 15 MG: 15 CAPSULE ORAL at 21:30

## 2023-08-27 ASSESSMENT — ACTIVITIES OF DAILY LIVING (ADL)
ADLS_ACUITY_SCORE: 35

## 2023-08-27 NOTE — ED PROVIDER NOTES
History     Chief Complaint:  Fever       HPI   Nela Suazo is a 82 year old female arriving via EMS after a fall and a fever. Patient sustained an unwitnessed fall earlier today. Patient denied hitting her head but shortly after began vomiting. Patient went to move from her wheel chair to a regular chair without assistance causing her to fall. Patient's staff at her assisted living took her temperature following the vomiting and noted a fever and increasing weakness. Patient's assisted living staff then called EMS. Patient reports right lower arm pain and nausea. Patient states that she was not exposed to any known illnesses lately. Patient states she has allergies and her symptoms are at baseline.    Independent Historian:   None - Patient Only    Review of External Notes:   none     Medications:    Albuterol neb   Mylanta/maalox  Aripiprazole   Symbicort   Calcium carbonate   Ciclopirox   Capsaicin   Zyrtec   Divalproex   Allegra   Fluticasone   Glipizide   Guaifenesin   Hydrochlorothiazide   Atrovent   Lisinopril   Loperamide   Magnesium hydroxide   Lisinopril   Nystatin  Omeprazole   Zoloft   simvastatin    Sennosides   Propranolol   Miralax     Past Medical History:    Anemia   Bipolar 1 disorder  Constipation   COPD  Depressive disorder   Hyperlipidemia   Hypertension   Hypopotassemia   Insomnia   MRSA infection   Osteoporosis  Overweight   Polyneuritis   Peripheral vascular disease   Reflux   Schizo affective schizophrenia   Sleep apnea   Tremor   Asthma   Colon polyp   Nuclear senile cataract of right eye  Candidiasis   Vasomotor rhinitis   Diabetes type 2  Gastric ulcer   GERD   DJD  Chronic obstructive pulmonary disease    Past Surgical History:    Hysterectomy   Tubal ligation   Tonsillectomy   Knee surgery   Cataract removal with implant x 2     Physical Exam   Patient Vitals for the past 24 hrs:   BP Temp Temp src Pulse Resp SpO2 Weight   08/27/23 1832 -- (!) 102.6  F (39.2  C) Oral -- -- -- --    08/27/23 1730 (!) 162/64 -- -- 87 -- 93 % --   08/27/23 1715 (!) 120/37 -- -- 87 -- 94 % 92.7 kg (204 lb 6.4 oz)   08/27/23 1641 (!) 148/71 -- -- 88 -- 91 % --   08/27/23 1634 -- (!) 103.2  F (39.6  C) Oral 88 18 91 % --        Physical Exam  Constitutional:       Appearance: Normal appearance. She is well-developed.   HENT:      Right Ear: External ear normal.      Left Ear: External ear normal.      Mouth/Throat:      Mouth: Mucous membranes are moist.      Pharynx: Oropharynx is clear. No oropharyngeal exudate or posterior oropharyngeal erythema.   Eyes:      General: No scleral icterus.     Extraocular Movements: Extraocular movements intact.      Conjunctiva/sclera: Conjunctivae normal.      Pupils: Pupils are equal, round, and reactive to light.   Cardiovascular:      Rate and Rhythm: Normal rate and regular rhythm.      Heart sounds: Normal heart sounds. No murmur heard.     No friction rub. No gallop.   Pulmonary:      Effort: Pulmonary effort is normal. No respiratory distress.      Breath sounds: Normal breath sounds. No wheezing or rales.   Abdominal:      General: Abdomen is flat. Bowel sounds are normal. There is no distension.      Palpations: Abdomen is soft. There is no mass.      Tenderness: There is no abdominal tenderness.   Musculoskeletal:      Cervical back: Normal range of motion and neck supple.      Right lower leg: No edema.      Left lower leg: No edema.   Skin:     General: Skin is warm and dry.      Capillary Refill: Capillary refill takes less than 2 seconds.      Findings: No rash.   Neurological:      Mental Status: She is alert.       Emergency Department Course   ECG  ECG taken at 1647, ECG read at 1650  Normal sinus rhythm   Minimal voltage criteria for LVH, may be normal variant   Inferior infarct, age undetermined   Abnormal ECG   Rate 88 bpm. ME interval 182 ms. QRS duration 94 ms. QT/QTc 328/396 ms. P-R-T axes 12 7 38.     Imaging:  XR Wrist Right G/E 3 Views   Final Result    IMPRESSION: Normal joint alignment. No fracture. 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      Radius/Ulna XR, PA & LAT, right   Final Result   IMPRESSION: Normal joint alignment. No fracture. 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      XR Chest 2 Views   Final Result   IMPRESSION: Heart is normal in size. Lungs are clear.         Report per radiology    Laboratory:  Labs Ordered and Resulted from Time of ED Arrival to Time of ED Departure   ROUTINE UA WITH MICROSCOPIC REFLEX TO CULTURE - Abnormal       Result Value    Color Urine Yellow      Appearance Urine Cloudy (*)     Glucose Urine Negative      Bilirubin Urine Negative      Ketones Urine Negative      Specific Gravity Urine 1.015      Blood Urine Large (*)     pH Urine 6.0      Protein Albumin Urine 300 (*)     Urobilinogen Urine Normal      Nitrite Urine Positive (*)     Leukocyte Esterase Urine Large (*)     WBC Clumps Urine Present (*)     Mucus Urine Present (*)     RBC Urine 106 (*)     WBC Urine >182 (*)     Squamous Epithelials Urine 2 (*)    COMPREHENSIVE METABOLIC PANEL - Abnormal    Sodium 129 (*)     Potassium 4.6      Chloride 95 (*)     Carbon Dioxide (CO2) 23      Anion Gap 11      Urea Nitrogen 25.7 (*)     Creatinine 1.06 (*)     Calcium 9.6      Glucose 232 (*)     Alkaline Phosphatase 81      AST 18      ALT 7      Protein Total 6.9      Albumin 3.7      Bilirubin Total 0.3      GFR Estimate 52 (*)    ISTAT GASES LACTATE VENOUS POCT - Abnormal    Lactic Acid POCT 1.5      Bicarbonate Venous POCT 23      O2 Sat, Venous POCT 33 (*)     pCO2 Venous POCT 38 (*)     pH Venous POCT 7.39      pO2 Venous POCT 20 (*)    CBC WITH PLATELETS AND DIFFERENTIAL - Abnormal    WBC Count 8.4      RBC Count 3.69 (*)     Hemoglobin 10.9 (*)     Hematocrit 33.1 (*)     MCV 90      MCH 29.5      MCHC 32.9      RDW 13.5      Platelet Count 187      % Neutrophils 88       % Lymphocytes 5      % Monocytes 5      % Eosinophils 0      % Basophils 0      % Immature Granulocytes 2      NRBCs per 100 WBC 0      Absolute Neutrophils 7.4      Absolute Lymphocytes 0.4 (*)     Absolute Monocytes 0.4      Absolute Eosinophils 0.0      Absolute Basophils 0.0      Absolute Immature Granulocytes 0.1      Absolute NRBCs 0.0     MAGNESIUM - Abnormal    Magnesium 1.6 (*)    INFLUENZA A/B, RSV, & SARS-COV2 PCR - Normal    Influenza A PCR Negative      Influenza B PCR Negative      RSV PCR Negative      SARS CoV2 PCR Negative     INR - Normal    INR 1.12     LACTIC ACID WHOLE BLOOD - Normal    Lactic Acid 1.8     GLUCOSE MONITOR NURSING POCT   GLUCOSE MONITOR NURSING POCT   BLOOD CULTURE   URINE CULTURE   BLOOD CULTURE      Emergency Department Course & Assessments:  Interventions:  Medications   ibuprofen (ADVIL/MOTRIN) tablet 600 mg (has no administration in time range)   0.9% sodium chloride BOLUS (1,000 mLs Intravenous $New Bag 8/27/23 1700)   acetaminophen (TYLENOL) tablet 975 mg (975 mg Oral $Given 8/27/23 1700)   cefTRIAXone (ROCEPHIN) 1 g vial to attach to  mL bag for ADULTS or NS 50 mL bag for PEDS (2 g Intravenous $New Bag 8/27/23 1745)     Independent Interpretation (X-rays, CTs, rhythm strip):  none    Assessments/Consultations/Discussion of Management or Tests:   ED Course as of 08/27/23 1842   Sun Aug 27, 2023   1641 I examined the patient and obtained history as noted above.     1731 I rechecked and updated the patient. Patient is trying to eat some crackers and applesauce. Patient does not want any pain medications right now.     1840 I spoke to Tracie Ceron regarding the patient, she accepted her for admission of behalf of Dr. Ferrer.     Social Determinants of Health affecting care:   None    Disposition:  The patient was admitted to the hospital under the care of Dr. Ferrer.     Impression & Plan      Medical Decision Making:  Patient presents today for  evaluation of vomiting and weakness and fever.  She was febrile on exam.  She had an injury in her right arm from the fall from early.  X-ray was negative for any fractures.  Her urine shows signs infection is likely the source of her fever.  She tested negative for COVID.  Chest x-ray did not show any pneumonia.  She is stable currently and not hypotensive or tachycardic.  She received Rocephin here for her UTI.  She will require admission for IV hydration and antibiotics.  I discussed the plan with her and she is comfortable with the treatment plan.    Diagnosis:    ICD-10-CM    1. Acute UTI  N39.0                Scribe Disclosure:  I, Betty Dorsey, am serving as a scribe at 4:53 PM on 8/27/2023 to document services personally performed by Zana Carver MD based on my observations and the provider's statements to me.   8/27/2023   Zana Carver MD Cheng, Wenlan, MD  08/27/23 2039

## 2023-08-27 NOTE — ED TRIAGE NOTES
Pt brought in by EMS for weakness, vomiting, fever, and right lower arm injury after having an unwitnessed fall this morning @ 0745 at nursing home.  Pt alert and orientated per EMS.       Triage Assessment       Row Name 08/27/23 3497       Triage Assessment (Adult)    Airway WDL WDL       Respiratory WDL    Respiratory WDL WDL       Skin Circulation/Temperature WDL    Skin Circulation/Temperature WDL X  Skin Tear on right lower FA       Cardiac WDL    Cardiac WDL WDL

## 2023-08-27 NOTE — H&P
[facility]  Internal Medicine  History and Physical      Patient Name: Nela Suazo MRN# [id number]   Age: 82 year old YOB: 1940     Date of Admission:8/27/2023    Primary care provider: Ramonita Jones  Date of Service: 8/27/2023         Assessment and Plan:   Nela Suazo is a 82 year old female with a history of Osteoporosis, MRSA, Pancreatic Cyst, Cataract, VERONICA, DM Type 2, VERONICA, Tremor, Obesity, GERD, Gastric Ulcer, HTN, HLD, Asthma, Bipolar 1 Disorder, Schizoaffective Disorder, Chronic Patellar Tendon Rupture who presented to the ED today with a fever and unwitnessed fall.    Sepsis  UTI - pt presents after a fall.  Found to be febrile at her facility and patient reporting a few weeks of dysuria.  Febrile 103.2, wbc 8.4, negative lactic acid.  Abnormal UA.  Negative LFTs, respiratory panel and CXR.   - continue IV Ceftriaxone  - follow up urine and blood cultures    Unwitnessed Fall  RUE Abrasion -  while transferring from her wheelchair to a chair, she lost her balance and fell.  She scraped her right forearm.  Did not hit her head, no LOC.  RUE xrays negative for fracture.  Pt uses a wheelchair at baseline.    RICKY  Hyponatremia  Hypochloremia - sodium 129 (131 when corrected for glucose) down from baseline 135-136, chloride 95.  Creatinine 1.06 up from baseline 0.7-0.9.  - IVF hydration    HTN - hold Lisinopril due to mild RICKY and sepsis    HLD - continue ASA and Simvastatin    DM Type 2 - hgb A1C 6.5 (3/2023).  BS on admission 232.  - start ISS protocol    Hx Asthma - continue Symbicort.  Albuterol prn    Chronic Anemia - hgb 10.9 at baseline.    VERONICA - continue cpap    GERD  Hx Gastric Ulcer - continue Omeprazole    Schizoaffective Disorder  Bipolar 1 Disorder - continue Abilify, Depakote, Sertraline, Restoril    Tremor - hold Propranolol for now due to sepsis, reassess in am    Obesity    CODE: full  Diet/IVF: regular, NS  GI ppx:  omeprazole  DVT ppx: scd    Tracie  Jie MELTON PA-C  Physician Assistant   Hospitalist Service    Awaiting formal pharmacy med rec to confirm home medications.         Chief Complaint:   fever and unwitnessed fall.         HPI:   82 year old female with a history of Osteoporosis, MRSA, Pancreatic Cyst, Cataract, VERONICA, DM Type 2, VERONICA, Tremor, Obesity, GERD, Gastric Ulcer, HTN, HLD, Asthma, Bipolar 1 Disorder, Schizoaffective Disorder, Chronic Patellar Tendon Rupture who presented to the ED today with a fever and unwitnessed fall.    Patient lives at [facility].  She uses a wheelchair.  Today while transferring from her wheelchair to a chair, she lost her balance and fell.  She scraped her right forearm and was on the ground for 40 minutes calling for staff to help her.  She denies hitting her head or LOC.  She denies any acute pain, cough, shortness of breath, chest pain, diarrhea, fevers, headache or neck pain.  She reports dysuria for the past few weeks.  Denies any back pain or hematuria.  After she fell, she had an episode of emesis.  Staff noted patient to have a fever and called EMS.         Past Medical History:     Past Medical History:   Diagnosis Date    Anemia     Bipolar 1 disorder (H)     Constipation     COPD (chronic obstructive pulmonary disease) (H)     Cough     Depressive disorder     Hyperlipidaemia hypopotass    Hypertension     Hypopotassemia     Insomnia     MRSA infection     Osteoporosis     Overweight     Polyneuritis     PVD (peripheral vascular disease) (H)     Reflux     Schizo affective schizophrenia (H)     Sleep apnea     Tremor     Uncomplicated asthma           Past Surgical History:   Cataract Surger  HYSTERECTOMY     LW Problem: Hysterectomy S/p LW Modifier: bilateral oophorectomy LW Onset: [id number]    TUBAL LIGATION         TONSILLECTOMY         KNEE SURGERY   Bilateral            Social History:     Social History     Socioeconomic History    Marital status: Single     Spouse name: Not on file    Number of children:  Not on file    Years of education: Not on file    Highest education level: Not on file   Occupational History    Not on file   Tobacco Use    Smoking status: Never    Smokeless tobacco: Not on file   Substance and Sexual Activity    Alcohol use: Not on file    Drug use: Not on file    Sexual activity: Not on file   Other Topics Concern    Not on file   Social History Narrative    Not on file     Social Determinants of Health     Financial Resource Strain: Not on file   Food Insecurity: Not on file   Transportation Needs: Not on file   Physical Activity: Not on file   Stress: Not on file   Social Connections: Not on file   Intimate Partner Violence: Not on file   Housing Stability: Not on file          Family History:   History reviewed. No pertinent family history.       Allergies:      Allergies   Allergen Reactions    Ace Inhibitors     Codeine Sulfate     Influenza Vac Typ     Oxycodone           Medications:     Prior to Admission medications    Medication Sig Last Dose Taking? Auth Provider Long Term End Date   ACETAMINOPHEN PO Take 1,000 mg by mouth 3 times daily   Unknown, Entered By History     albuterol (2.5 MG/3ML) 0.083% nebulizer solution Take 1 ampule by nebulization every 4 hours as needed    Reported, Patient     alum & mag hydroxide-simethicone (MYLANTA/MAALOX) 200-200-20 MG/5ML SUSP Take 30 mLs by mouth every 4 hours as needed (gastric distress)   Unknown, Entered By History     AMOXICILLIN PO Take 2,000 g by mouth as needed. 2000 Grams before dental procedures   Reported, Patient     ARIPiprazole (ABILIFY) 30 MG tablet Take 30 mg by mouth daily.   Reported, Patient     aspirin (ASPIRIN EC LO-DOSE) 81 MG EC tablet Take 81 mg by mouth every other day    Reported, Patient     budesonide-formoterol (SYMBICORT) 80-4.5 MCG/ACT inhaler Inhale 2 puffs into the lungs 2 times daily.   Reported, Patient     Calcium Carb-Cholecalciferol (CALCIUM + D3) 600-200 MG-UNIT TABS Take 1 tablet by mouth 2 times daily    Unknown, Entered By History     calcium carbonate (OYST-MAIA) 500 MG tablet Take 2 tablets by mouth 3 times daily After meal   Reported, Patient     Calcium Carbonate Antacid (TUMS PO) Take 1 chew tab by mouth 4 times daily as needed    Reported, Patient     capsaicin (ZOSTRIX) 0.025 % CREA Apply 1 applicator topically 4 times daily Apply to left shoulder and lower back/spine.   Unknown, Entered By History     cetirizine (ZYRTEC) 10 MG tablet Take 10 mg by mouth daily   Reported, Patient     Cholecalciferol (VITAMIN D3) 1000 UNITS CAPS Take 1,000 Units by mouth   Reported, Patient     Ciclopirox (PENLAC EX)    Reported, Patient     Dextromethorphan-Guaifenesin (ROBITUSSIN DM PO) Take  by mouth every 4 hours as needed.   Reported, Patient     divalproex (DEPAKOTE ER) 250 MG 24 hr tablet Take 250 mg by mouth At Bedtime Give in addition to 1000mg dose at bedtime. Total daily = 1250mg.   Unknown, Entered By History Yes    divalproex (DEPAKOTE ER) 500 MG 24 hr tablet Take 1,000 mg by mouth At Bedtime Give with 250mg at bedtime   Unknown, Entered By History Yes    fexofenadine (ALLEGRA) 60 MG tablet Take 60 mg by mouth daily   Unknown, Entered By History     fluticasone (FLONASE ALLERGY RELIEF) 50 MCG/ACT spray Spray 1 spray into both nostrils daily   Reported, Patient     glipiZIDE (GLUCOTROL) 10 MG tablet Take 10 mg by mouth   Reported, Patient Yes    guaiFENesin (ROBITUSSIN) 100 MG/5ML SYRP Take 5 mLs by mouth every 4 hours as needed for cough   Unknown, Entered By History     hydrochlorothiazide 12.5 MG TABS Take 12.5 mg by mouth daily    Reported, Patient     ipratropium (ATROVENT) 0.02 % nebulizer solution Take 0.5 mg by nebulization every 4 hours as needed (Obstructive chronic airway)   Unknown, Entered By History Yes    ipratropium (ATROVENT) 0.03 % spray Spray 2 sprays into both nostrils every 12 hours   Reported, Patient     lisinopril (PRINIVIL/ZESTRIL) 2.5 MG tablet Take 2.5 mg by mouth   Reported, Patient Yes     loperamide (IMODIUM A-D) 2 MG tablet Take 2 mg by mouth 4 times daily as needed (up to 8mg/24 hours)    Reported, Patient     Magnesium Hydroxide (MILK OF MAGNESIA PO)    Reported, Patient     nystatin (MYCOSTATIN) 730219 UNIT/GM POWD Apply topically 2 times daily 1 application twice dailt to reddened areas of skin for rash.   Unknown, Entered By History     Omeprazole 20 MG tablet Take 40 mg by mouth daily    Reported, Patient     ORDER FOR DME 2 L.   Reported, Patient     polyethylene glycol (MIRALAX/GLYCOLAX) powder Take 1 capful by mouth daily   Reported, Patient     polyvinyl alcohol (LIQUIFILM TEARS) 1.4 % ophthalmic solution 1 drop 4 times daily   Unknown, Entered By History     polyvinyl alcohol (LIQUIFILM TEARS) 1.4 % ophthalmic solution Place 1 drop into both eyes every 2 hours as needed (May keep in room ans self administer)   Unknown, Entered By History     potassium chloride (KLOR-CON) 20 MEQ packet Take 20 mEq by mouth daily    Reported, Patient     Propranolol HCl (INDERAL) 60 MG TABS Take 60 mg by mouth 3 times daily    Reported, Patient     sennosides (SENOKOT) 8.6 MG tablet Take 3 tablets by mouth At Bedtime   Unknown, Entered By History     sertraline (ZOLOFT) 100 MG tablet Take 250 mg by mouth daily    Reported, Patient     SIMETHICONE-80 PO Take 80 mg by mouth 2 times daily After lunch and supper   Unknown, Entered By History     simvastatin (ZOCOR) 20 MG tablet Take 20 mg by mouth At Bedtime.   Reported, Patient     zinc oxide 20 % ointment Apply topically as needed for dry skin or irritation   Reported, Patient            Review of Systems:   A complete ROS was performed and is negative other than what is stated in the HPI.       Physical Exam:   Blood pressure 112/55, pulse 87, temperature 98.7  F (37.1  C), temperature source Oral, resp. rate 18, weight 92.7 kg (204 lb 6.4 oz), SpO2 94 %.  General: Alert, interactive, NAD, sitting up in bed, pleasant and cooperative.  HEENT: AT/NC, sclera  anicteric, PERRL, EOMI, MMM  Chest/Resp: clear to auscultation bilaterally, no crackles or wheezes  Heart/CV: regular rate and rhythm, no murmur  Abdomen/GI: Soft, nontender, nondistended. +BS.  No rebound or guarding.  Extremities/MSK: No LE edema.  RUE abrasion  Skin: Warm and dry  Neuro: Alert & oriented x 3, no focal deficits, moves all extremities equally.  RUE tremor noted         Labs:   ROUTINE ICU LABS (Last four results)  CMP  Recent Labs   Lab 08/27/23  1637   *   POTASSIUM 4.6   CHLORIDE 95*   CO2 23   ANIONGAP 11   *   BUN 25.7*   CR 1.06*   GFRESTIMATED 52*   MAIA 9.6   PROTTOTAL 6.9   ALBUMIN 3.7   BILITOTAL 0.3   ALKPHOS 81   AST 18   ALT 7     CBC  Recent Labs   Lab 08/27/23  1637   WBC 8.4   RBC 3.69*   HGB 10.9*   HCT 33.1*   MCV 90   MCH 29.5   MCHC 32.9   RDW 13.5        INR  Recent Labs   Lab 08/27/23  1637   INR 1.12     Arterial Blood GasNo lab results found in last 7 days.       Imaging/Procedures:     Results for orders placed or performed during the hospital encounter of 08/27/23   XR Chest 2 Views    Narrative    EXAM: XR CHEST 2 VIEWS  LOCATION: [facility]  DATE: 8/27/2023    INDICATION: fever  COMPARISON: 11/7/2014      Impression    IMPRESSION: Heart is normal in size. Lungs are clear.   XR Wrist Right G/E 3 Views    Narrative    EXAM: XR FOREARM RIGHT 2 VIEWS, XR WRIST RIGHT G/E 3 VIEWS  LOCATION: [facility]  DATE: 8/27/2023    INDICATION: Arm pain after a fall.  COMPARISON: None.      Impression    IMPRESSION: Normal joint alignment. No fracture. 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   Radius/Ulna XR, PA & LAT, right    Narrative    EXAM: XR FOREARM RIGHT 2 VIEWS, XR WRIST RIGHT G/E 3 VIEWS  LOCATION: [facility]  DATE: 8/27/2023    INDICATION: Arm pain after a fall.  COMPARISON: None.      Impression    IMPRESSION: Normal joint alignment. No  fracture. 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

## 2023-08-27 NOTE — ED NOTES
[facility]  ED Nurse Handoff Report    ED Chief complaint: Fever  . ED Diagnosis:   Final diagnoses:   Acute UTI       Allergies:   Allergies   Allergen Reactions    Ace Inhibitors     Codeine Sulfate     Influenza Vac Typ     Oxycodone        Code Status: Advance care planning    Activity level - Baseline/Home:  independent.  Activity Level - Current:   assist of 1.   Lift room needed: No.   Bariatric: No   Needed: No   Isolation: No.   Infection: Not Applicable.     Respiratory status: Room air    Vital Signs (within 30 minutes):   Vitals:    08/27/23 1634 08/27/23 1641 08/27/23 1715 08/27/23 1730   BP:  (!) 148/71 (!) 120/37 (!) 162/64   Pulse: 88 88 87 87   Resp: 18      Temp: (!) 103.2  F (39.6  C)      TempSrc: Oral      SpO2: 91% 91% 94% 93%   Weight:   92.7 kg (204 lb 6.4 oz)        Cardiac Rhythm:  ,      Pain level:    Patient confused: No.   Patient Falls Risk: bed/chair alarm on, nonskid shoes/slippers when out of bed, arm band in place, patient and family education, assistive device/personal items within reach, activity supervised, mobility aid in reach, room door open, and toileting schedule implemented.   Elimination Status: Has voided     Patient Report - Initial Complaint: Fever.   Focused Assessment: Pt sent from Nursing home after falling this morning with skin tear on right lower arm, then this afternoon vomiting, fever, & weakness. A&O per EMS.     Abnormal Results:   Labs Ordered and Resulted from Time of ED Arrival to Time of ED Departure   ROUTINE UA WITH MICROSCOPIC REFLEX TO CULTURE - Abnormal       Result Value    Color Urine Yellow      Appearance Urine Cloudy (*)     Glucose Urine Negative      Bilirubin Urine Negative      Ketones Urine Negative      Specific Gravity Urine 1.015      Blood Urine Large (*)     pH Urine 6.0      Protein Albumin Urine 300 (*)     Urobilinogen Urine Normal      Nitrite Urine Positive (*)     Leukocyte Esterase Urine Large  (*)     WBC Clumps Urine Present (*)     Mucus Urine Present (*)     RBC Urine 106 (*)     WBC Urine >182 (*)     Squamous Epithelials Urine 2 (*)    COMPREHENSIVE METABOLIC PANEL - Abnormal    Sodium 129 (*)     Potassium 4.6      Chloride 95 (*)     Carbon Dioxide (CO2) 23      Anion Gap 11      Urea Nitrogen 25.7 (*)     Creatinine 1.06 (*)     Calcium 9.6      Glucose 232 (*)     Alkaline Phosphatase 81      AST 18      ALT 7      Protein Total 6.9      Albumin 3.7      Bilirubin Total 0.3      GFR Estimate 52 (*)    ISTAT GASES LACTATE VENOUS POCT - Abnormal    Lactic Acid POCT 1.5      Bicarbonate Venous POCT 23      O2 Sat, Venous POCT 33 (*)     pCO2 Venous POCT 38 (*)     pH Venous POCT 7.39      pO2 Venous POCT 20 (*)    CBC WITH PLATELETS AND DIFFERENTIAL - Abnormal    WBC Count 8.4      RBC Count 3.69 (*)     Hemoglobin 10.9 (*)     Hematocrit 33.1 (*)     MCV 90      MCH 29.5      MCHC 32.9      RDW 13.5      Platelet Count 187      % Neutrophils 88      % Lymphocytes 5      % Monocytes 5      % Eosinophils 0      % Basophils 0      % Immature Granulocytes 2      NRBCs per 100 WBC 0      Absolute Neutrophils 7.4      Absolute Lymphocytes 0.4 (*)     Absolute Monocytes 0.4      Absolute Eosinophils 0.0      Absolute Basophils 0.0      Absolute Immature Granulocytes 0.1      Absolute NRBCs 0.0     INFLUENZA A/B, RSV, & SARS-COV2 PCR - Normal    Influenza A PCR Negative      Influenza B PCR Negative      RSV PCR Negative      SARS CoV2 PCR Negative     INR - Normal    INR 1.12     LACTIC ACID WHOLE BLOOD - Normal    Lactic Acid 1.8     BLOOD CULTURE   URINE CULTURE   BLOOD CULTURE        XR Chest 2 Views   Final Result   IMPRESSION: Heart is normal in size. Lungs are clear.      XR Wrist Right G/E 3 Views    (Results Pending)   Radius/Ulna XR, PA & LAT, right    (Results Pending)       Treatments provided: See MAR  Family Comments: not present  OBS brochure/video discussed/provided to patient:  No  ED  Medications:   Medications   0.9% sodium chloride BOLUS (1,000 mLs Intravenous $New Bag 8/27/23 1700)   acetaminophen (TYLENOL) tablet 975 mg (975 mg Oral $Given 8/27/23 1700)   cefTRIAXone (ROCEPHIN) 1 g vial to attach to  mL bag for ADULTS or NS 50 mL bag for PEDS (2 g Intravenous $New Bag 8/27/23 1745)       Drips infusing:  No  For the majority of the shift this patient was Green.   Interventions performed were NA.    Sepsis treatment initiated: No    Cares/treatment/interventions/medications to be completed following ED care: None    ED Nurse Name: Mag Saldana RN  6:19 PM  RECEIVING UNIT ED HANDOFF REVIEW    Above ED Nurse Handoff Report was reviewed: Yes  Reviewed by: Ivory Byrd RN on August 27, 2023 at 7:37 PM

## 2023-08-28 LAB
ANION GAP SERPL CALCULATED.3IONS-SCNC: 8 MMOL/L (ref 7–15)
ATRIAL RATE - MUSE: 88 BPM
BACTERIA UR CULT: ABNORMAL
BUN SERPL-MCNC: 27.6 MG/DL (ref 8–23)
CALCIUM SERPL-MCNC: 8.7 MG/DL (ref 8.8–10.2)
CHLORIDE SERPL-SCNC: 101 MMOL/L (ref 98–107)
CREAT SERPL-MCNC: 1.04 MG/DL (ref 0.51–0.95)
DEPRECATED HCO3 PLAS-SCNC: 23 MMOL/L (ref 22–29)
DIASTOLIC BLOOD PRESSURE - MUSE: NORMAL MMHG
ERYTHROCYTE [DISTWIDTH] IN BLOOD BY AUTOMATED COUNT: 13.9 % (ref 10–15)
GFR SERPL CREATININE-BSD FRML MDRD: 53 ML/MIN/1.73M2
GLUCOSE BLDC GLUCOMTR-MCNC: 108 MG/DL (ref 70–99)
GLUCOSE BLDC GLUCOMTR-MCNC: 128 MG/DL (ref 70–99)
GLUCOSE BLDC GLUCOMTR-MCNC: 153 MG/DL (ref 70–99)
GLUCOSE BLDC GLUCOMTR-MCNC: 165 MG/DL (ref 70–99)
GLUCOSE BLDC GLUCOMTR-MCNC: 177 MG/DL (ref 70–99)
GLUCOSE SERPL-MCNC: 129 MG/DL (ref 70–99)
HCT VFR BLD AUTO: 27.4 % (ref 35–47)
HGB BLD-MCNC: 8.9 G/DL (ref 11.7–15.7)
INTERPRETATION ECG - MUSE: NORMAL
LACTATE SERPL-SCNC: 0.8 MMOL/L (ref 0.7–2)
MAGNESIUM SERPL-MCNC: 1.7 MG/DL (ref 1.7–2.3)
MCH RBC QN AUTO: 29.8 PG (ref 26.5–33)
MCHC RBC AUTO-ENTMCNC: 32.5 G/DL (ref 31.5–36.5)
MCV RBC AUTO: 92 FL (ref 78–100)
P AXIS - MUSE: 12 DEGREES
PLATELET # BLD AUTO: 137 10E3/UL (ref 150–450)
POTASSIUM SERPL-SCNC: 3.9 MMOL/L (ref 3.4–5.3)
POTASSIUM SERPL-SCNC: 4.3 MMOL/L (ref 3.4–5.3)
PR INTERVAL - MUSE: 182 MS
QRS DURATION - MUSE: 94 MS
QT - MUSE: 328 MS
QTC - MUSE: 396 MS
R AXIS - MUSE: 7 DEGREES
RBC # BLD AUTO: 2.99 10E6/UL (ref 3.8–5.2)
SODIUM SERPL-SCNC: 132 MMOL/L (ref 136–145)
SYSTOLIC BLOOD PRESSURE - MUSE: NORMAL MMHG
T AXIS - MUSE: 38 DEGREES
VENTRICULAR RATE- MUSE: 88 BPM
WBC # BLD AUTO: 8.7 10E3/UL (ref 4–11)

## 2023-08-28 PROCEDURE — 36415 COLL VENOUS BLD VENIPUNCTURE: CPT | Performed by: INTERNAL MEDICINE

## 2023-08-28 PROCEDURE — 94660 CPAP INITIATION&MGMT: CPT

## 2023-08-28 PROCEDURE — 120N000001 HC R&B MED SURG/OB

## 2023-08-28 PROCEDURE — 83605 ASSAY OF LACTIC ACID: CPT | Performed by: INTERNAL MEDICINE

## 2023-08-28 PROCEDURE — 258N000003 HC RX IP 258 OP 636: Performed by: INTERNAL MEDICINE

## 2023-08-28 PROCEDURE — 84132 ASSAY OF SERUM POTASSIUM: CPT | Performed by: INTERNAL MEDICINE

## 2023-08-28 PROCEDURE — 250N000012 HC RX MED GY IP 250 OP 636 PS 637: Performed by: PHYSICIAN ASSISTANT

## 2023-08-28 PROCEDURE — 258N000003 HC RX IP 258 OP 636: Performed by: PHYSICIAN ASSISTANT

## 2023-08-28 PROCEDURE — 80048 BASIC METABOLIC PNL TOTAL CA: CPT | Performed by: PHYSICIAN ASSISTANT

## 2023-08-28 PROCEDURE — 36415 COLL VENOUS BLD VENIPUNCTURE: CPT | Performed by: PHYSICIAN ASSISTANT

## 2023-08-28 PROCEDURE — 250N000013 HC RX MED GY IP 250 OP 250 PS 637: Performed by: PHYSICIAN ASSISTANT

## 2023-08-28 PROCEDURE — 999N000157 HC STATISTIC RCP TIME EA 10 MIN

## 2023-08-28 PROCEDURE — 250N000011 HC RX IP 250 OP 636: Mod: JZ | Performed by: PHYSICIAN ASSISTANT

## 2023-08-28 PROCEDURE — 99233 SBSQ HOSP IP/OBS HIGH 50: CPT | Performed by: INTERNAL MEDICINE

## 2023-08-28 PROCEDURE — 83735 ASSAY OF MAGNESIUM: CPT | Performed by: INTERNAL MEDICINE

## 2023-08-28 PROCEDURE — 85027 COMPLETE CBC AUTOMATED: CPT | Performed by: PHYSICIAN ASSISTANT

## 2023-08-28 RX ADMIN — SIMVASTATIN 10 MG: 10 TABLET, FILM COATED ORAL at 20:44

## 2023-08-28 RX ADMIN — DIVALPROEX SODIUM 1000 MG: 500 TABLET, FILM COATED, EXTENDED RELEASE ORAL at 20:43

## 2023-08-28 RX ADMIN — SODIUM CHLORIDE: 9 INJECTION, SOLUTION INTRAVENOUS at 18:30

## 2023-08-28 RX ADMIN — ONDANSETRON 4 MG: 2 INJECTION INTRAMUSCULAR; INTRAVENOUS at 13:50

## 2023-08-28 RX ADMIN — TEMAZEPAM 15 MG: 15 CAPSULE ORAL at 20:44

## 2023-08-28 RX ADMIN — ARIPIPRAZOLE 30 MG: 10 TABLET ORAL at 20:44

## 2023-08-28 RX ADMIN — SERTRALINE HYDROCHLORIDE 200 MG: 100 TABLET ORAL at 09:17

## 2023-08-28 RX ADMIN — CEFTRIAXONE 2 G: 1 INJECTION, POWDER, FOR SOLUTION INTRAMUSCULAR; INTRAVENOUS at 18:28

## 2023-08-28 RX ADMIN — SODIUM CHLORIDE: 9 INJECTION, SOLUTION INTRAVENOUS at 10:12

## 2023-08-28 RX ADMIN — DIVALPROEX SODIUM 250 MG: 250 TABLET, FILM COATED, EXTENDED RELEASE ORAL at 20:46

## 2023-08-28 RX ADMIN — PANTOPRAZOLE SODIUM 40 MG: 40 TABLET, DELAYED RELEASE ORAL at 09:17

## 2023-08-28 RX ADMIN — SERTRALINE HYDROCHLORIDE 100 MG: 100 TABLET ORAL at 13:52

## 2023-08-28 RX ADMIN — FLUTICASONE FUROATE AND VILANTEROL TRIFENATATE 1 PUFF: 100; 25 POWDER RESPIRATORY (INHALATION) at 09:40

## 2023-08-28 RX ADMIN — ACETAMINOPHEN 650 MG: 325 TABLET, FILM COATED ORAL at 14:45

## 2023-08-28 RX ADMIN — SODIUM CHLORIDE 500 ML: 9 INJECTION, SOLUTION INTRAVENOUS at 04:14

## 2023-08-28 RX ADMIN — INSULIN ASPART 1 UNITS: 100 INJECTION, SOLUTION INTRAVENOUS; SUBCUTANEOUS at 13:47

## 2023-08-28 RX ADMIN — POLYETHYLENE GLYCOL 3350 17 G: 17 POWDER, FOR SOLUTION ORAL at 09:17

## 2023-08-28 RX ADMIN — BUPROPION HYDROCHLORIDE 100 MG: 100 TABLET, FILM COATED, EXTENDED RELEASE ORAL at 09:17

## 2023-08-28 RX ADMIN — INSULIN ASPART 1 UNITS: 100 INJECTION, SOLUTION INTRAVENOUS; SUBCUTANEOUS at 18:34

## 2023-08-28 ASSESSMENT — ACTIVITIES OF DAILY LIVING (ADL)
ADLS_ACUITY_SCORE: 42
ADLS_ACUITY_SCORE: 44
ADLS_ACUITY_SCORE: 42
ADLS_ACUITY_SCORE: 42
ADLS_ACUITY_SCORE: 40
ADLS_ACUITY_SCORE: 42
ADLS_ACUITY_SCORE: 42
ADLS_ACUITY_SCORE: 40
ADLS_ACUITY_SCORE: 40
ADLS_ACUITY_SCORE: 42
ADLS_ACUITY_SCORE: 40
ADLS_ACUITY_SCORE: 40

## 2023-08-28 NOTE — PHARMACY-ADMISSION MEDICATION HISTORY
Pharmacist Admission Medication History    Admission medication history is complete. The information provided in this note is only as accurate as the sources available at the time of the update.    Medication reconciliation/reorder completed by provider prior to medication history? Yes    Information Source(s): Facility (U/NH/) medication list/MAR via  MAR    Pertinent Information: none    Changes made to PTA medication list:  Added: temazepam, wellbutrin, advair, beano,   Deleted: symbicort, Ca Vit D, cetirizine, ciclopirox, allegra, glipipizide, hydrochlorothiazide, potassium chloride, simethicone  Changed: vit d, simvastatin, tylenol, capsaicin, desitin, flonase, lisinopril, omeprazole, propranolol, senna, sertraline,     Medication Affordability:  Not including over the counter (OTC) medications, was there a time in the past 3 months when you did not take your medications as prescribed because of cost?: No    Allergies reviewed with patient and updates made in EHR: yes    Medication History Completed By: Lukasz Fuentes RPH 8/27/2023 8:29 PM    Prior to Admission medications    Medication Sig Last Dose Taking? Auth Provider Long Term End Date   ACETAMINOPHEN PO Take 1,000 mg by mouth 2 times daily 8/27/2023 at am Yes Unknown, Entered By History     alpha-d-galactosidase (BEANO) tablet Take 2 tablets by mouth 2 times daily (before meals) 8/27/2023 at 1200 Yes Unknown, Entered By History     ARIPiprazole (ABILIFY) 30 MG tablet Take 30 mg by mouth daily. 8/26/2023 at pm Yes Reported, Patient     aspirin (ASPIRIN EC LO-DOSE) 81 MG EC tablet Take 81 mg by mouth every other day  8/25/2023 at pm Yes Reported, Patient     buPROPion (WELLBUTRIN SR) 100 MG 12 hr tablet Take 100 mg by mouth daily 8/27/2023 at am Yes Unknown, Entered By History Yes    capsaicin (ZOSTRIX) 0.025 % CREA Apply 1 applicator topically 3 times daily Apply to left shoulder and lower back/spine. 8/27/2023 at 1200 Yes Unknown, Entered By History      Cholecalciferol (VITAMIN D3) 1000 UNITS CAPS Take 2,000 Units by mouth 8/27/2023 at am Yes Reported, Patient     divalproex (DEPAKOTE ER) 250 MG 24 hr tablet Take 250 mg by mouth every evening Give in addition to 1000mg dose at pm Total daily = 1250mg. 8/26/2023 at pm Yes Unknown, Entered By History Yes    divalproex (DEPAKOTE ER) 500 MG 24 hr tablet Take 1,000 mg by mouth every evening Give with 250mg at pm 8/26/2023 at pm Yes Unknown, Entered By History Yes    fluticasone (FLONASE ALLERGY RELIEF) 50 MCG/ACT spray Spray 1 spray into both nostrils 2 times daily 8/27/2023 at am Yes Reported, Patient     fluticasone-salmeterol (ADVAIR HFA) 115-21 MCG/ACT inhaler Inhale 1 puff into the lungs 2 times daily 8/27/2023 at x1 Yes Unknown, Entered By History     ipratropium (ATROVENT) 0.03 % spray Spray 2 sprays into both nostrils 4 times daily 8/27/2023 at 1200 Yes Reported, Patient     lisinopril (ZESTRIL) 20 MG tablet Take 20 mg by mouth daily 8/27/2023 at am Yes Unknown, Entered By History Yes    Magnesium Hydroxide (MILK OF MAGNESIA PO) Take 30 mLs by mouth daily as needed  Yes Reported, Patient     Omeprazole 20 MG tablet Take 20 mg by mouth daily 8/27/2023 at am Yes Reported, Patient     polyethylene glycol (MIRALAX/GLYCOLAX) powder Take 17 g by mouth daily 8/27/2023 at am Yes Reported, Patient     polyvinyl alcohol (LIQUIFILM TEARS) 1.4 % ophthalmic solution Place 1 drop into both eyes 4 times daily 8/27/2023 at 1200 Yes Unknown, Entered By History     propranolol (INDERAL) 40 MG tablet Take 40 mg by mouth 3 times daily 8/27/2023 at 1200 Yes Unknown, Entered By History     senna-docusate (SENOKOT-S/PERICOLACE) 8.6-50 MG tablet Take 3 tablets by mouth every evening 8/26/2023 at pm Yes Unknown, Entered By History     senna-docusate (SENOKOT-S/PERICOLACE) 8.6-50 MG tablet Take 1 tablet by mouth 2 times daily as needed for constipation  Yes Unknown, Entered By History     sertraline (ZOLOFT) 100 MG tablet Take 100 mg  by mouth daily at 2 pm 8/27/2023 at 1400 Yes Unknown, Entered By History     sertraline (ZOLOFT) 100 MG tablet Take 200 mg by mouth every morning 8/27/2023 at 0800 Yes Reported, Patient     simvastatin (ZOCOR) 20 MG tablet Take 10 mg by mouth every evening 8/26/2023 at pm Yes Reported, Patient     temazepam (RESTORIL) 15 MG capsule Take 15 mg by mouth every evening 8/26/2023 at pm Yes Unknown, Entered By History     zinc oxide (DESITIN) 40 % paste Apply topically 2 times daily Under breast and abdominal folds  Yes Unknown, Entered By History     albuterol (2.5 MG/3ML) 0.083% nebulizer solution Take 1 ampule by nebulization every 4 hours as needed   Patient not taking: Reported on 8/27/2023 Not Taking  Reported, Patient     alum & mag hydroxide-simethicone (MYLANTA/MAALOX) 200-200-20 MG/5ML SUSP Take 30 mLs by mouth every 4 hours as needed (gastric distress)  Patient not taking: Reported on 8/27/2023 Not Taking  Unknown, Entered By History     AMOXICILLIN PO Take 2,000 g by mouth as needed. 2000 Grams before dental procedures   Reported, Patient     Calcium Carbonate Antacid (TUMS PO) Take 1 chew tab by mouth 4 times daily as needed   Patient not taking: Reported on 8/27/2023 Not Taking  Reported, Patient     Dextromethorphan-Guaifenesin (ROBITUSSIN DM PO) Take  by mouth every 4 hours as needed.  Patient not taking: Reported on 8/27/2023 Not Taking  Reported, Patient     guaiFENesin (ROBITUSSIN) 100 MG/5ML SYRP Take 5 mLs by mouth every 4 hours as needed for cough  Patient not taking: Reported on 8/27/2023 Not Taking  Unknown, Entered By History     ipratropium (ATROVENT) 0.02 % nebulizer solution Take 0.5 mg by nebulization every 4 hours as needed (Obstructive chronic airway)  Patient not taking: Reported on 8/27/2023 Not Taking  Unknown, Entered By History Yes    loperamide (IMODIUM A-D) 2 MG tablet Take 2 mg by mouth 4 times daily as needed (up to 8mg/24 hours)   Patient not taking: Reported on 8/27/2023 Not  Taking  Reported, Patient     nystatin (MYCOSTATIN) 430919 UNIT/GM POWD Apply topically 2 times daily 1 application twice dailt to reddened areas of skin for rash.  Patient not taking: Reported on 8/27/2023 Not Taking  Unknown, Entered By History     ORDER FOR DME 2 L.   Reported, Patient

## 2023-08-28 NOTE — PROGRESS NOTES
Cross cover notified that blood pressure is trending down, now systolic of 104.  Admitted yesterday evening for UTI with temperature of 103.2  F.  Received 1 L NS earlier.  -Give 500 mL NS bolus

## 2023-08-28 NOTE — PLAN OF CARE
End of Shift Summary  For vital signs and complete assessments, please see documentation flowsheets.     Pertinent assessments: Pt A&Ox4. Afebrile.Low BP's. CPAP overnight. Denies pain, nausea, & SOB. Ax2 with lift. PIV running IVF. B.     Major Shift Events: IV Bolus given    Treatment Plan: Abx. IVF.Symptom management. Monitor BP & BG    Bedside Nurse: Too Sears RN

## 2023-08-28 NOTE — CONSULTS
"NUTRITION ASSESSMENT      REASON FOR NUTRITION CONSULT:  Malnutrition screening tool (MST) total score of \"2\" with \"unsure\" answered to the question of \"have you recently lost weight without trying?\".      ASSESSMENT:  Per review of available but limited wt trending below, wt relatively stable since at least 11/2023 and no current documentation of edema:  Wt Readings from Last 10 Encounters:   08/27/23 91.9 kg (202 lb 9.6 oz)   11/07/14 125.4 kg (276 lb 7.3 oz)   11/22/10 112.9 kg (249 lb)   Weight 93.4 kg (206 lb) 11/30/2022 2:32 PM CST --> from care everywhere.    FOLLOW UP:   Will assess at LOS per policy, unless formally consulted in the interim.  Please formally consult if needs arise.    Eneida Wilson RDN, LD  Clinical Dietitian  3rd floor/ICU: [phone]  All other floors: [phone]  Weekend/holiday: [phone]  Office: [phone]  "

## 2023-08-28 NOTE — PLAN OF CARE
End of Shift Summary  For vital signs and complete assessments, please see documentation flowsheets.     Pertinent assessments: Pt A&Ox4. T Max 101.5, Tylenol given. Sepsis alert triggered, LA 0.8. Pt vomited x1 this shift, given IV zofran. Ax2 with lift. PIV running IVF. ACHS sugar checks.     Major Shift Events: Fever, vomiting    Treatment Plan: Abx. IVF.Symptom management. Monitor Temp, BP & BG    Bedside Nurse: Sheeba Garcia RN

## 2023-08-28 NOTE — PROGRESS NOTES
[facility]  Hospitalist Progress Note  Mando Swan M.D., M.B.A.   08/28/2023    Reason for Stay/active problem list    Acute complicated UTI  Unwitnessed fall         Assessment and Plan:        Summary of Stay: Nela Suazo is a 82 year old female with a history of Osteoporosis, MRSA, Pancreatic Cyst, Cataract, VERONICA, DM Type 2, VERONICA, Tremor, Obesity, GERD, Gastric Ulcer, HTN, HLD, Asthma, Bipolar 1 Disorder, Schizoaffective Disorder, Chronic Patellar Tendon Rupture who presented to the ED t with a fever and unwitnessed fall.      Problem List with Assessment and Plan:    Sepsis  UTI - pt presents after a fall.  Found to be febrile at her facility and patient reporting a few weeks of dysuria.  Febrile 103.2, wbc 8.4, negative lactic acid.  Abnormal UA.  Negative LFTs, respiratory panel and CXR.   -Patient was admitted and was treated with IV fluids, IV ceftriaxone and closely monitored.    -She required IV fluid bolus overnight for soft blood pressure.  Currently she is having chills but no fever.  Continue antibiotic, monitor cultures       Unwitnessed Fall  RUE Abrasion -  while transferring from her wheelchair to a chair, she lost her balance and fell.  She scraped her right forearm.  Did not hit her head, no LOC.  RUE xrays negative for fracture.  Pt uses a wheelchair at baseline.  --PT and OT service consulted to assist with discharge planning     RICKY  Hyponatremia  Hypochloremia -  --on presentation sodium 129 (131 when corrected for glucose) down from baseline 135-136, chloride 95.  Creatinine 1.06 up from baseline 0.7-0.9.  -Patient was treated with IV fluid, monitored closely.  -Currently sodium is 132, kidney function is stable.  Monitor BMP, continue IV fluid       HTN -   -- Continue to hold Lisinopril due to mild RICKY and sepsis     HLD - continued ASA and Simvastatin     DM Type 2 - hgb A1C 6.5 (3/2023).  BS on admission 232.  - SS protocol     Hx Asthma - continue Symbicort.   "Albuterol prn     Chronic Anemia -   --hgb 10.9 at baseline.  --Currently 8.9.  Likely from hemodilution.  No acute bleeding.  Continue to monitor hemoglobin     VERONICA - continue cpap     GERD  Hx Gastric Ulcer - continue Omeprazole     Schizoaffective Disorder  Bipolar 1 Disorder - continue Abilify, Depakote, Sertraline, Restoril     Tremor -continue to hold Propranolol for now due to sepsis, reassess in am     Obesity    Plan for today:  Continue antibiotic, monitor cultures  PT and OT assessment for discharge needs      VTE Prophylaxis: Enoxaparin (Lovenox) SQ  Code Status: Full Code  Diet: Combination Diet Regular Diet Adult    Callejas Catheter: Not present    Family updated today: No     Disposition: May discharge in the next 2 days she remained stable.        Interval History (Subjective):        Patient is seen and examined by me today and medical record reviewed.Overnight events noted and care discussed with nursing staff.  Patient's care assumed by me this morning.  She is complaining of chills with no fever.  No headaches or neck stiffness.  No diarrhea, nausea or vomiting.  She would like us to call a restaurant to cancel reservation.  Care plan discussed with bedside nurse.       Physical Exam:        Last Vital Signs:  BP (!) 156/35 (BP Location: Left arm)   Pulse 68   Temp (!) 96.2  F (35.7  C) (Axillary)   Resp 18   Ht 1.676 m (5' 6\")   Wt 91.9 kg (202 lb 9.6 oz)   SpO2 98%   BMI 32.70 kg/m      No intake/output data recorded.    Wt Readings from Last 5 Encounters:   08/27/23 91.9 kg (202 lb 9.6 oz)   11/07/14 125.4 kg (276 lb 7.3 oz)   11/22/10 112.9 kg (249 lb)        Constitutional: Awake, alert, cooperative, no apparent distress     Respiratory: Clear to auscultation bilaterally, no crackles or wheezing   Cardiovascular: Regular rate and rhythm, normal S1 and S2, and no murmur noted   Abdomen: Normal bowel sounds, soft, non-distended, non-tender   Skin: No new rashes, no cyanosis, dry to touch "   Neuro: Alert with  no new focal weakness   Extremities: No edema   Other(s):        All other systems: Negative          Medications:        All current medications were reviewed with changes reflected in problem list.         Data:      All new lab and imaging data was reviewed.      Data reviewed today: I reviewed all new labs and imaging results over the last 24 hours. I personally reviewed       Recent Labs   Lab 08/28/23  0556 08/27/23  1637   WBC 8.7 8.4   HGB 8.9* 10.9*   HCT 27.4* 33.1*   MCV 92 90   * 187     No results for input(s): CULT in the last 168 hours.  Recent Labs   Lab 08/28/23  0907 08/28/23  0556 08/28/23  0217 08/27/23 2108 08/27/23  1637   NA  --  132*  --   --  129*   POTASSIUM  --  3.9  --   --  4.6   CHLORIDE  --  101  --   --  95*   CO2  --  23  --   --  23   ANIONGAP  --  8  --   --  11   * 129* 153*   < > 232*   BUN  --  27.6*  --   --  25.7*   CR  --  1.04*  --   --  1.06*   GFRESTIMATED  --  53*  --   --  52*   MAIA  --  8.7*  --   --  9.6   MAG  --   --   --   --  1.6*   PROTTOTAL  --   --   --   --  6.9   ALBUMIN  --   --   --   --  3.7   BILITOTAL  --   --   --   --  0.3   ALKPHOS  --   --   --   --  81   AST  --   --   --   --  18   ALT  --   --   --   --  7    < > = values in this interval not displayed.       Recent Labs   Lab 08/28/23  0907 08/28/23  0556 08/28/23  0217 08/27/23 2108 08/27/23  1637   * 129* 153* 215* 232*       Recent Labs   Lab 08/27/23  1637   INR 1.12         No results for input(s): TROPONIN, TROPI, TROPR in the last 168 hours.    Invalid input(s): TROP, TROPONINIES    Recent Results (from the past 48 hour(s))   XR Chest 2 Views    Narrative    EXAM: XR CHEST 2 VIEWS  LOCATION: [facility]  DATE: 8/27/2023    INDICATION: fever  COMPARISON: 11/7/2014      Impression    IMPRESSION: Heart is normal in size. Lungs are clear.   Radius/Ulna XR, PA & LAT, right    Narrative    EXAM: XR FOREARM RIGHT 2 VIEWS, XR WRIST  RIGHT G/E 3 VIEWS  LOCATION: [facility]  DATE: 8/27/2023    INDICATION: Arm pain after a fall.  COMPARISON: None.      Impression    IMPRESSION: Normal joint alignment. No fracture. 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   XR Wrist Right G/E 3 Views    Narrative    EXAM: XR FOREARM RIGHT 2 VIEWS, XR WRIST RIGHT G/E 3 VIEWS  LOCATION: [facility]  DATE: 8/27/2023    INDICATION: Arm pain after a fall.  COMPARISON: None.      Impression    IMPRESSION: Normal joint alignment. No fracture. 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       COVID Status:  COVID-19 PCR Results          4/25/2022    10:45 5/21/2022    10:45 11/28/2022    11:00 8/27/2023    16:39   COVID-19 PCR Results   SARS CoV2 PCR Negative  Negative  Negative  Negative      COVID-19 Antibody Results, Testing for Immunity           No data to display                 Disclaimer: This note consists of symbols derived from keyboarding, dictation and/or voice recognition software. As a result, there may be errors in the script that have gone undetected. Please consider this when interpreting information found in this chart.

## 2023-08-28 NOTE — PROGRESS NOTES
A CPAP of  +10 @ 28% was applied to the pt via the mask for an VERONICA.The bridge of the nose looks good and remains intact. Pt is tolerating it well. Will continue to monitor and assess the pt's current respiratory status and needs.    Elissa Shin, RT

## 2023-08-28 NOTE — PROVIDER NOTIFICATION
Notified provider that pt's DBP's have been low throughout the night. 0200:145/55 0340:106/36 0351:104/41.  Interventions?

## 2023-08-29 LAB
ANION GAP SERPL CALCULATED.3IONS-SCNC: 7 MMOL/L (ref 7–15)
BUN SERPL-MCNC: 16.4 MG/DL (ref 8–23)
CALCIUM SERPL-MCNC: 8.7 MG/DL (ref 8.8–10.2)
CHLORIDE SERPL-SCNC: 105 MMOL/L (ref 98–107)
CREAT SERPL-MCNC: 0.76 MG/DL (ref 0.51–0.95)
DEPRECATED HCO3 PLAS-SCNC: 21 MMOL/L (ref 22–29)
ERYTHROCYTE [DISTWIDTH] IN BLOOD BY AUTOMATED COUNT: 13.9 % (ref 10–15)
GFR SERPL CREATININE-BSD FRML MDRD: 78 ML/MIN/1.73M2
GLUCOSE BLDC GLUCOMTR-MCNC: 129 MG/DL (ref 70–99)
GLUCOSE BLDC GLUCOMTR-MCNC: 138 MG/DL (ref 70–99)
GLUCOSE BLDC GLUCOMTR-MCNC: 139 MG/DL (ref 70–99)
GLUCOSE BLDC GLUCOMTR-MCNC: 139 MG/DL (ref 70–99)
GLUCOSE BLDC GLUCOMTR-MCNC: 181 MG/DL (ref 70–99)
GLUCOSE SERPL-MCNC: 135 MG/DL (ref 70–99)
HCT VFR BLD AUTO: 26.4 % (ref 35–47)
HGB BLD-MCNC: 8.7 G/DL (ref 11.7–15.7)
LACTATE SERPL-SCNC: 0.5 MMOL/L (ref 0.7–2)
MAGNESIUM SERPL-MCNC: 1.7 MG/DL (ref 1.7–2.3)
MCH RBC QN AUTO: 29.8 PG (ref 26.5–33)
MCHC RBC AUTO-ENTMCNC: 33 G/DL (ref 31.5–36.5)
MCV RBC AUTO: 90 FL (ref 78–100)
PLATELET # BLD AUTO: 140 10E3/UL (ref 150–450)
POTASSIUM SERPL-SCNC: 4.3 MMOL/L (ref 3.4–5.3)
RBC # BLD AUTO: 2.92 10E6/UL (ref 3.8–5.2)
SODIUM SERPL-SCNC: 133 MMOL/L (ref 136–145)
WBC # BLD AUTO: 7.6 10E3/UL (ref 4–11)

## 2023-08-29 PROCEDURE — 94640 AIRWAY INHALATION TREATMENT: CPT

## 2023-08-29 PROCEDURE — 999N000157 HC STATISTIC RCP TIME EA 10 MIN

## 2023-08-29 PROCEDURE — 120N000001 HC R&B MED SURG/OB

## 2023-08-29 PROCEDURE — 36415 COLL VENOUS BLD VENIPUNCTURE: CPT | Performed by: INTERNAL MEDICINE

## 2023-08-29 PROCEDURE — 250N000011 HC RX IP 250 OP 636: Mod: JZ | Performed by: INTERNAL MEDICINE

## 2023-08-29 PROCEDURE — 258N000003 HC RX IP 258 OP 636: Performed by: INTERNAL MEDICINE

## 2023-08-29 PROCEDURE — 250N000013 HC RX MED GY IP 250 OP 250 PS 637: Performed by: PHYSICIAN ASSISTANT

## 2023-08-29 PROCEDURE — 85027 COMPLETE CBC AUTOMATED: CPT | Performed by: INTERNAL MEDICINE

## 2023-08-29 PROCEDURE — 250N000011 HC RX IP 250 OP 636: Performed by: PHYSICIAN ASSISTANT

## 2023-08-29 PROCEDURE — 250N000013 HC RX MED GY IP 250 OP 250 PS 637: Performed by: INTERNAL MEDICINE

## 2023-08-29 PROCEDURE — 83735 ASSAY OF MAGNESIUM: CPT | Performed by: INTERNAL MEDICINE

## 2023-08-29 PROCEDURE — 94660 CPAP INITIATION&MGMT: CPT

## 2023-08-29 PROCEDURE — 99233 SBSQ HOSP IP/OBS HIGH 50: CPT | Performed by: INTERNAL MEDICINE

## 2023-08-29 PROCEDURE — 83605 ASSAY OF LACTIC ACID: CPT | Performed by: INTERNAL MEDICINE

## 2023-08-29 PROCEDURE — 80048 BASIC METABOLIC PNL TOTAL CA: CPT | Performed by: INTERNAL MEDICINE

## 2023-08-29 RX ORDER — PROPRANOLOL HYDROCHLORIDE 40 MG/1
40 TABLET ORAL 3 TIMES DAILY
Status: DISCONTINUED | OUTPATIENT
Start: 2023-08-29 | End: 2023-08-31 | Stop reason: HOSPADM

## 2023-08-29 RX ORDER — LISINOPRIL 20 MG/1
20 TABLET ORAL DAILY
Status: DISCONTINUED | OUTPATIENT
Start: 2023-08-29 | End: 2023-08-31 | Stop reason: HOSPADM

## 2023-08-29 RX ORDER — HYDRALAZINE HYDROCHLORIDE 20 MG/ML
10 INJECTION INTRAMUSCULAR; INTRAVENOUS EVERY 4 HOURS PRN
Status: DISCONTINUED | OUTPATIENT
Start: 2023-08-29 | End: 2023-08-31

## 2023-08-29 RX ADMIN — ASPIRIN 81 MG: 81 TABLET, COATED ORAL at 21:26

## 2023-08-29 RX ADMIN — FLUTICASONE FUROATE AND VILANTEROL TRIFENATATE 1 PUFF: 100; 25 POWDER RESPIRATORY (INHALATION) at 07:34

## 2023-08-29 RX ADMIN — PROPRANOLOL HYDROCHLORIDE 40 MG: 40 TABLET ORAL at 15:49

## 2023-08-29 RX ADMIN — ONDANSETRON 4 MG: 4 TABLET, ORALLY DISINTEGRATING ORAL at 16:37

## 2023-08-29 RX ADMIN — SERTRALINE HYDROCHLORIDE 100 MG: 100 TABLET ORAL at 13:56

## 2023-08-29 RX ADMIN — PANTOPRAZOLE SODIUM 40 MG: 40 TABLET, DELAYED RELEASE ORAL at 08:08

## 2023-08-29 RX ADMIN — ARIPIPRAZOLE 30 MG: 10 TABLET ORAL at 21:26

## 2023-08-29 RX ADMIN — TEMAZEPAM 15 MG: 15 CAPSULE ORAL at 21:21

## 2023-08-29 RX ADMIN — SODIUM CHLORIDE: 9 INJECTION, SOLUTION INTRAVENOUS at 02:35

## 2023-08-29 RX ADMIN — INSULIN ASPART 1 UNITS: 100 INJECTION, SOLUTION INTRAVENOUS; SUBCUTANEOUS at 13:57

## 2023-08-29 RX ADMIN — PROPRANOLOL HYDROCHLORIDE 40 MG: 40 TABLET ORAL at 21:25

## 2023-08-29 RX ADMIN — DIVALPROEX SODIUM 1000 MG: 500 TABLET, FILM COATED, EXTENDED RELEASE ORAL at 21:27

## 2023-08-29 RX ADMIN — SERTRALINE HYDROCHLORIDE 200 MG: 100 TABLET ORAL at 08:08

## 2023-08-29 RX ADMIN — HYDRALAZINE HYDROCHLORIDE 10 MG: 20 INJECTION, SOLUTION INTRAMUSCULAR; INTRAVENOUS at 04:51

## 2023-08-29 RX ADMIN — DIVALPROEX SODIUM 250 MG: 250 TABLET, FILM COATED, EXTENDED RELEASE ORAL at 21:21

## 2023-08-29 RX ADMIN — CEFTRIAXONE 2 G: 1 INJECTION, POWDER, FOR SOLUTION INTRAMUSCULAR; INTRAVENOUS at 17:26

## 2023-08-29 RX ADMIN — SENNOSIDES AND DOCUSATE SODIUM 3 TABLET: 50; 8.6 TABLET ORAL at 21:22

## 2023-08-29 RX ADMIN — PROPRANOLOL HYDROCHLORIDE 40 MG: 40 TABLET ORAL at 11:04

## 2023-08-29 RX ADMIN — BUPROPION HYDROCHLORIDE 100 MG: 100 TABLET, FILM COATED, EXTENDED RELEASE ORAL at 08:08

## 2023-08-29 RX ADMIN — SIMVASTATIN 10 MG: 10 TABLET, FILM COATED ORAL at 21:22

## 2023-08-29 RX ADMIN — LISINOPRIL 20 MG: 20 TABLET ORAL at 11:04

## 2023-08-29 RX ADMIN — POLYETHYLENE GLYCOL 3350 17 G: 17 POWDER, FOR SOLUTION ORAL at 08:08

## 2023-08-29 ASSESSMENT — ACTIVITIES OF DAILY LIVING (ADL)
ADLS_ACUITY_SCORE: 40
ADLS_ACUITY_SCORE: 44
ADLS_ACUITY_SCORE: 40
ADLS_ACUITY_SCORE: 44
ADLS_ACUITY_SCORE: 44
ADLS_ACUITY_SCORE: 40
ADLS_ACUITY_SCORE: 44
ADLS_ACUITY_SCORE: 40
ADLS_ACUITY_SCORE: 44
ADLS_ACUITY_SCORE: 40

## 2023-08-29 NOTE — PLAN OF CARE
End of Shift Summary  For vital signs and complete assessments, please see documentation flowsheets.     Pertinent assessments: Pt A&Ox4. VSS. Afebrile this shift. On RA. Zofran x1 given for nausea. No complaints of pain. ACHS sugar checks.     Major Shift Events: PT consulted, probable discharge tomorrow.    Treatment Plan: Abx. Symptom management.     Bedside Nurse: Sheeba Garcia RN

## 2023-08-29 NOTE — PLAN OF CARE
Goal Outcome Evaluation:      Pertinent assessments: Pt A&Ox4. VSS On RA. Cpap @ night. Ax2 with lift. Denied pain and nausea. PIV running IVF. Using purewick. Sepsis alert triggered, LA 0.5. ACHS sugar checks, 128, 138. Slept well. .     Major Shift Events: /64, PRN hydralazine given.    Treatment Plan: Abx. IVF.Symptom management. Monitor Temp, BP & BG    Bedside Nurse: Chrissy Rocha RN

## 2023-08-29 NOTE — PROGRESS NOTES
A CPAP of +10 @ 28% was applied to the pt via the mask for NOC use. The bridge of the nose looks good and remains intact. Pt is tolerating it well. Will continue to monitor and assess the pt's current respiratory status and needs.    Pacheco Laird RT on 8/29/2023 at 4:25 AM

## 2023-08-29 NOTE — PROGRESS NOTES
"A CPAP of  +10 @ 28% was applied to the pt via the mask for an VERONICA.The bridge of the nose looks good and remains intact. Pt is tolerating it well. Will continue to monitor and assess the pt's current respiratory status and needs.     Vital signs:  Temp: 99.8  F (37.7  C) Temp src: Oral BP: (!) 149/64 Pulse: 80   Resp: 20 SpO2: 99 % O2 Device: BiPAP/CPAP   Height: 167.6 cm (5' 6\") Weight: 91.9 kg (202 lb 9.6 oz)  Estimated body mass index is 32.7 kg/m  as calculated from the following:    Height as of this encounter: 1.676 m (5' 6\").    Weight as of this encounter: 91.9 kg (202 lb 9.6 oz).        "

## 2023-08-29 NOTE — CONSULTS
Care Management Initial Consult    General Information  Assessment completed with: Patient,    Type of CM/SW Visit: Initial Assessment    Primary Care Provider verified and updated as needed:     Readmission within the last 30 days:           Advance Care Planning:            Communication Assessment  Patient's communication style: spoken language (English or Bilingual)    Hearing Difficulty or Deaf: no   Wear Glasses or Blind: no    Cognitive  Cognitive/Neuro/Behavioral: WDL                      Living Environment:   People in home: facility resident     Current living Arrangements: residential facility  Name of Facility: [facility]   Able to return to prior arrangements: yes       Family/Social Support:  Care provided by:    Provides care for: no one, unable/limited ability to care for self                Description of Support System:           Current Resources:   Patient receiving home care services: No     Community Resources:    Equipment currently used at home: wheelchair, power  Supplies currently used at home:      Employment/Financial:  Employment Status:          Financial Concerns:             Does the patient's insurance plan have a 3 day qualifying hospital stay waiver?  Yes   Will the waiver be used for post-acute placement? No    Lifestyle & Psychosocial Needs:  Social Determinants of Health     Tobacco Use: Unknown (8/27/2023)    Patient History     Smoking Tobacco Use: Never     Smokeless Tobacco Use: Unknown     Passive Exposure: Not on file   Alcohol Use: Not on file   Financial Resource Strain: Not on file   Food Insecurity: Not on file   Transportation Needs: Not on file   Physical Activity: Not on file   Stress: Not on file   Social Connections: Not on file   Intimate Partner Violence: Not on file   Depression: Not at risk (11/7/2018)    PHQ-2     PHQ-2 Score: 0   Housing Stability: Not on file             Additional Information:  Consult placed for discharge  "planning. Patient admitted with UTI and fall transferring from her wheelchair.  Met with patient at the bedside and she confirms she is from [facility] and uses an electric wheelchair at baseline. She states she can get around \"ok\" and is able to get herself to the bathroom on her own. She states she has been in LTC for 18 years but unclear if this is accurate.  When asked about transportation back to the LTC she is unsure but thinks maybe stretcher. Patient has been requiring a lift here but would likely be able to sit in a wheelchair for transport.  Contacted [facility] who confirms that patient is in their LTC and has a bed hold.  Will continue to follow for discharge planning. Possibility of discharge tomorrow if medically stable.    Paige Edgar RN BSN OCN  Care Coordinator  [facility]  [phone]          "

## 2023-08-29 NOTE — PROGRESS NOTES
[facility]  Hospitalist Progress Note  Mando Swan M.D., M.B.A.   08/29/2023    Reason for Stay/active problem list    Acute complicated UTI  Unwitnessed fall         Assessment and Plan:        Summary of Stay: Nela Suazo is a 82 year old female with a history of Osteoporosis, MRSA, Pancreatic Cyst, Cataract, VERONICA, DM Type 2, VERONICA, Tremor, Obesity, GERD, Gastric Ulcer, HTN, HLD, Asthma, Bipolar 1 Disorder, Schizoaffective Disorder, Chronic Patellar Tendon Rupture who presented to the ED t with a fever and unwitnessed fall.      Problem List with Assessment and Plan:    Sepsis  UTI - pt presents after a fall.  Found to be febrile at her facility and patient reporting a few weeks of dysuria.  Febrile 103.2, wbc 8.4, negative lactic acid.  Abnormal UA.  Negative LFTs, respiratory panel and CXR.   -Patient was admitted and was treated with IV fluids, IV ceftriaxone and closely monitored.    -Her condition is improving but she does not feel back to her baseline.  Urine culture growing E. Coli pretty much sensitive to all antibiotics except for ampicillin and Bactrim.  No hypotension or evidence of sepsis overnight.  Continue antibiotic, monitor blood cultures    Unwitnessed Fall  RUE Abrasion -  while transferring from her wheelchair to a chair, she lost her balance and fell.  She scraped her right forearm.  Did not hit her head, no LOC.  RUE xrays negative for fracture.  Pt uses a wheelchair at baseline.  --PT and OT service consulted to assist with discharge planning     RICKY  Hyponatremia  Hypochloremia -  --on presentation sodium 129 (131 when corrected for glucose) down from baseline 135-136, chloride 95.  Creatinine 1.06 up from baseline 0.7-0.9.  -Patient was treated with IV fluid, monitored closely.  -Currently sodium is 133, kidney function is stable.  Monitor BMP, discontinue IV fluid     HTN -   -- Blood pressure uncontrolled, will resume lisinopril and propanolol with  "parameters    HLD - continued ASA and Simvastatin     DM Type 2 - hgb A1C 6.5 (3/2023).  BS on admission 232.  - SS protocol     Hx Asthma - continue Symbicort.  Albuterol prn     Chronic Anemia -   --hgb 10.9 at baseline.  --Currently 8.7.  Likely from hemodilution.  No acute bleeding.  Continue to monitor hemoglobin     VERONICA - continue cpap     GERD  Hx Gastric Ulcer - continue Omeprazole     Schizoaffective Disorder  Bipolar 1 Disorder - continue Abilify, Depakote, Sertraline, Restoril     Tremor -resume propranolol    Obesity      VTE Prophylaxis: Enoxaparin (Lovenox) SQ  Code Status: Full Code  Diet: Combination Diet Regular Diet Adult    Callejas Catheter: Not present    Family updated today: No     Disposition: May discharge home if she remains stable        Interval History (Subjective):        Patient is seen and examined by me today and medical record reviewed.Overnight events noted and care discussed with nursing staff.  Patient is feeling better.  She denies any fever or chills.  No urinary symptoms.  She feels weak and not at the baseline.  She does not feel like she is strong enough to go home today.       Physical Exam:        Last Vital Signs:  BP (!) 145/46 (BP Location: Right arm)   Pulse 68   Temp 98.6  F (37  C) (Oral)   Resp 16   Ht 1.676 m (5' 6\")   Wt 93.2 kg (205 lb 7.5 oz)   SpO2 94%   BMI 33.16 kg/m      I/O last 3 completed shifts:  In: 5421 [I.V.:5421]  Out: 700 [Urine:700]    Wt Readings from Last 5 Encounters:   08/29/23 93.2 kg (205 lb 7.5 oz)   11/07/14 125.4 kg (276 lb 7.3 oz)   11/22/10 112.9 kg (249 lb)        Constitutional: Awake, alert, cooperative, no apparent distress     Respiratory: Clear to auscultation bilaterally, no crackles or wheezing   Cardiovascular: Regular rate and rhythm, normal S1 and S2, and no murmur noted   Abdomen: Normal bowel sounds, soft, non-distended, non-tender   Skin: No new rashes, no cyanosis, dry to touch   Neuro: Alert with  no new focal " weakness   Extremities: No edema   Other(s):        All other systems: Negative          Medications:        All current medications were reviewed with changes reflected in problem list.         Data:      All new lab and imaging data was reviewed.      Data reviewed today: I reviewed all new labs and imaging results over the last 24 hours. I personally reviewed       Recent Labs   Lab 08/29/23  0622 08/28/23  0556 08/27/23  1637   WBC 7.6 8.7 8.4   HGB 8.7* 8.9* 10.9*   HCT 26.4* 27.4* 33.1*   MCV 90 92 90   * 137* 187       No results for input(s): CULT in the last 168 hours.  Recent Labs   Lab 08/29/23  1114 08/29/23  0758 08/29/23  0622 08/28/23  1217 08/28/23  0924 08/28/23  0907 08/28/23  0556 08/27/23  2108 08/27/23  1637   NA  --   --  133*  --   --   --  132*  --  129*   POTASSIUM  --   --  4.3  --  4.3  --  3.9  --  4.6   CHLORIDE  --   --  105  --   --   --  101  --  95*   CO2  --   --  21*  --   --   --  23  --  23   ANIONGAP  --   --  7  --   --   --  8  --  11   * 139* 135*   < >  --    < > 129*   < > 232*   BUN  --   --  16.4  --   --   --  27.6*  --  25.7*   CR  --   --  0.76  --   --   --  1.04*  --  1.06*   GFRESTIMATED  --   --  78  --   --   --  53*  --  52*   MAIA  --   --  8.7*  --   --   --  8.7*  --  9.6   MAG  --   --  1.7  --  1.7  --   --   --  1.6*   PROTTOTAL  --   --   --   --   --   --   --   --  6.9   ALBUMIN  --   --   --   --   --   --   --   --  3.7   BILITOTAL  --   --   --   --   --   --   --   --  0.3   ALKPHOS  --   --   --   --   --   --   --   --  81   AST  --   --   --   --   --   --   --   --  18   ALT  --   --   --   --   --   --   --   --  7    < > = values in this interval not displayed.         Recent Labs   Lab 08/29/23  1114 08/29/23  0758 08/29/23  0622 08/29/23  0224 08/28/23  2121   * 139* 135* 138* 128*         Recent Labs   Lab 08/27/23  1637   INR 1.12           No results for input(s): TROPONIN, TROPI, TROPR in the last 168 hours.    Invalid  input(s): TROP, TROPONINIES    Recent Results (from the past 48 hour(s))   XR Chest 2 Views    Narrative    EXAM: XR CHEST 2 VIEWS  LOCATION: [facility]  DATE: 8/27/2023    INDICATION: fever  COMPARISON: 11/7/2014      Impression    IMPRESSION: Heart is normal in size. Lungs are clear.   Radius/Ulna XR, PA & LAT, right    Narrative    EXAM: XR FOREARM RIGHT 2 VIEWS, XR WRIST RIGHT G/E 3 VIEWS  LOCATION: [facility]  DATE: 8/27/2023    INDICATION: Arm pain after a fall.  COMPARISON: None.      Impression    IMPRESSION: Normal joint alignment. No fracture. 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   XR Wrist Right G/E 3 Views    Narrative    EXAM: XR FOREARM RIGHT 2 VIEWS, XR WRIST RIGHT G/E 3 VIEWS  LOCATION: [facility]  DATE: 8/27/2023    INDICATION: Arm pain after a fall.  COMPARISON: None.      Impression    IMPRESSION: Normal joint alignment. No fracture. 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       COVID Status:  COVID-19 PCR Results          4/25/2022    10:45 5/21/2022    10:45 11/28/2022    11:00 8/27/2023    16:39   COVID-19 PCR Results   SARS CoV2 PCR Negative  Negative  Negative  Negative      COVID-19 Antibody Results, Testing for Immunity           No data to display                 Disclaimer: This note consists of symbols derived from keyboarding, dictation and/or voice recognition software. As a result, there may be errors in the script that have gone undetected. Please consider this when interpreting information found in this chart.

## 2023-08-30 ENCOUNTER — APPOINTMENT (OUTPATIENT)
Dept: PHYSICAL THERAPY | Facility: CLINIC | Age: 83
DRG: 872 | End: 2023-08-30
Attending: INTERNAL MEDICINE
Payer: COMMERCIAL

## 2023-08-30 LAB
ANION GAP SERPL CALCULATED.3IONS-SCNC: 8 MMOL/L (ref 7–15)
BUN SERPL-MCNC: 15.5 MG/DL (ref 8–23)
CALCIUM SERPL-MCNC: 9.2 MG/DL (ref 8.8–10.2)
CHLORIDE SERPL-SCNC: 104 MMOL/L (ref 98–107)
CREAT SERPL-MCNC: 0.73 MG/DL (ref 0.51–0.95)
DEPRECATED HCO3 PLAS-SCNC: 24 MMOL/L (ref 22–29)
ERYTHROCYTE [DISTWIDTH] IN BLOOD BY AUTOMATED COUNT: 13.7 % (ref 10–15)
GFR SERPL CREATININE-BSD FRML MDRD: 82 ML/MIN/1.73M2
GLUCOSE BLDC GLUCOMTR-MCNC: 121 MG/DL (ref 70–99)
GLUCOSE BLDC GLUCOMTR-MCNC: 129 MG/DL (ref 70–99)
GLUCOSE BLDC GLUCOMTR-MCNC: 132 MG/DL (ref 70–99)
GLUCOSE BLDC GLUCOMTR-MCNC: 134 MG/DL (ref 70–99)
GLUCOSE BLDC GLUCOMTR-MCNC: 135 MG/DL (ref 70–99)
GLUCOSE BLDC GLUCOMTR-MCNC: 145 MG/DL (ref 70–99)
GLUCOSE SERPL-MCNC: 136 MG/DL (ref 70–99)
HCT VFR BLD AUTO: 26.4 % (ref 35–47)
HGB BLD-MCNC: 8.7 G/DL (ref 11.7–15.7)
MAGNESIUM SERPL-MCNC: 1.5 MG/DL (ref 1.7–2.3)
MAGNESIUM SERPL-MCNC: 2.5 MG/DL (ref 1.7–2.3)
MCH RBC QN AUTO: 29.6 PG (ref 26.5–33)
MCHC RBC AUTO-ENTMCNC: 33 G/DL (ref 31.5–36.5)
MCV RBC AUTO: 90 FL (ref 78–100)
PLATELET # BLD AUTO: 150 10E3/UL (ref 150–450)
POTASSIUM SERPL-SCNC: 4.6 MMOL/L (ref 3.4–5.3)
RBC # BLD AUTO: 2.94 10E6/UL (ref 3.8–5.2)
SODIUM SERPL-SCNC: 136 MMOL/L (ref 136–145)
WBC # BLD AUTO: 6 10E3/UL (ref 4–11)

## 2023-08-30 PROCEDURE — 250N000013 HC RX MED GY IP 250 OP 250 PS 637: Performed by: PHYSICIAN ASSISTANT

## 2023-08-30 PROCEDURE — 250N000013 HC RX MED GY IP 250 OP 250 PS 637: Performed by: INTERNAL MEDICINE

## 2023-08-30 PROCEDURE — 83735 ASSAY OF MAGNESIUM: CPT | Performed by: INTERNAL MEDICINE

## 2023-08-30 PROCEDURE — 36415 COLL VENOUS BLD VENIPUNCTURE: CPT | Performed by: INTERNAL MEDICINE

## 2023-08-30 PROCEDURE — 250N000013 HC RX MED GY IP 250 OP 250 PS 637: Performed by: STUDENT IN AN ORGANIZED HEALTH CARE EDUCATION/TRAINING PROGRAM

## 2023-08-30 PROCEDURE — 94660 CPAP INITIATION&MGMT: CPT

## 2023-08-30 PROCEDURE — 97161 PT EVAL LOW COMPLEX 20 MIN: CPT | Mod: GP | Performed by: PHYSICAL THERAPIST

## 2023-08-30 PROCEDURE — 120N000001 HC R&B MED SURG/OB

## 2023-08-30 PROCEDURE — 99232 SBSQ HOSP IP/OBS MODERATE 35: CPT | Performed by: INTERNAL MEDICINE

## 2023-08-30 PROCEDURE — 85027 COMPLETE CBC AUTOMATED: CPT | Performed by: INTERNAL MEDICINE

## 2023-08-30 PROCEDURE — 80048 BASIC METABOLIC PNL TOTAL CA: CPT | Performed by: INTERNAL MEDICINE

## 2023-08-30 PROCEDURE — 250N000011 HC RX IP 250 OP 636: Mod: JZ | Performed by: INTERNAL MEDICINE

## 2023-08-30 PROCEDURE — 94640 AIRWAY INHALATION TREATMENT: CPT

## 2023-08-30 PROCEDURE — 999N000157 HC STATISTIC RCP TIME EA 10 MIN

## 2023-08-30 PROCEDURE — 97530 THERAPEUTIC ACTIVITIES: CPT | Mod: GP | Performed by: PHYSICAL THERAPIST

## 2023-08-30 RX ORDER — CEFDINIR 300 MG/1
300 CAPSULE ORAL EVERY 12 HOURS SCHEDULED
Status: DISCONTINUED | OUTPATIENT
Start: 2023-08-30 | End: 2023-08-31 | Stop reason: HOSPADM

## 2023-08-30 RX ORDER — MAGNESIUM SULFATE HEPTAHYDRATE 40 MG/ML
4 INJECTION, SOLUTION INTRAVENOUS ONCE
Status: COMPLETED | OUTPATIENT
Start: 2023-08-30 | End: 2023-08-30

## 2023-08-30 RX ADMIN — MAGNESIUM SULFATE HEPTAHYDRATE 4 G: 4 INJECTION, SOLUTION INTRAVENOUS at 10:36

## 2023-08-30 RX ADMIN — CEFDINIR 300 MG: 300 CAPSULE ORAL at 21:46

## 2023-08-30 RX ADMIN — PANTOPRAZOLE SODIUM 40 MG: 40 TABLET, DELAYED RELEASE ORAL at 08:35

## 2023-08-30 RX ADMIN — ARIPIPRAZOLE 30 MG: 10 TABLET ORAL at 21:40

## 2023-08-30 RX ADMIN — LISINOPRIL 20 MG: 20 TABLET ORAL at 08:35

## 2023-08-30 RX ADMIN — PROPRANOLOL HYDROCHLORIDE 40 MG: 40 TABLET ORAL at 21:39

## 2023-08-30 RX ADMIN — DIVALPROEX SODIUM 1000 MG: 500 TABLET, FILM COATED, EXTENDED RELEASE ORAL at 21:42

## 2023-08-30 RX ADMIN — FLUTICASONE FUROATE AND VILANTEROL TRIFENATATE 1 PUFF: 100; 25 POWDER RESPIRATORY (INHALATION) at 07:53

## 2023-08-30 RX ADMIN — SERTRALINE HYDROCHLORIDE 100 MG: 100 TABLET ORAL at 14:19

## 2023-08-30 RX ADMIN — DIVALPROEX SODIUM 250 MG: 250 TABLET, FILM COATED, EXTENDED RELEASE ORAL at 21:41

## 2023-08-30 RX ADMIN — SENNOSIDES AND DOCUSATE SODIUM 3 TABLET: 50; 8.6 TABLET ORAL at 21:38

## 2023-08-30 RX ADMIN — POLYETHYLENE GLYCOL 3350 17 G: 17 POWDER, FOR SOLUTION ORAL at 08:35

## 2023-08-30 RX ADMIN — SERTRALINE HYDROCHLORIDE 200 MG: 100 TABLET ORAL at 08:34

## 2023-08-30 RX ADMIN — PROPRANOLOL HYDROCHLORIDE 40 MG: 40 TABLET ORAL at 08:34

## 2023-08-30 RX ADMIN — TEMAZEPAM 15 MG: 15 CAPSULE ORAL at 21:40

## 2023-08-30 RX ADMIN — PROPRANOLOL HYDROCHLORIDE 40 MG: 40 TABLET ORAL at 16:25

## 2023-08-30 RX ADMIN — BUPROPION HYDROCHLORIDE 100 MG: 100 TABLET, FILM COATED, EXTENDED RELEASE ORAL at 08:34

## 2023-08-30 RX ADMIN — SIMVASTATIN 10 MG: 10 TABLET, FILM COATED ORAL at 21:40

## 2023-08-30 ASSESSMENT — ACTIVITIES OF DAILY LIVING (ADL)
ADLS_ACUITY_SCORE: 40
ADLS_ACUITY_SCORE: 44
ADLS_ACUITY_SCORE: 44
ADLS_ACUITY_SCORE: 40
ADLS_ACUITY_SCORE: 44

## 2023-08-30 NOTE — PROGRESS NOTES
08/30/23 1327   Appointment Info   Signing Clinician's Name / Credentials (PT) Robert Bernstein DPT   Living Environment   Living Environment Comments Pt lives in LTC. Reports being able to transfer in room ind'ly, then use electric w/c to manage distances. Use of 4ww for transfers. Reports facility has ability to use lift, sit <> stand devices for transfers.   Self-Care   Usual Activity Tolerance fair   Current Activity Tolerance fair   Equipment Currently Used at Home walker, rolling;wheelchair, power   Fall history within last six months yes   Number of times patient has fallen within last six months 1   General Information   Onset of Illness/Injury or Date of Surgery 08/27/23   Referring Physician Mando Swan MD   Patient/Family Therapy Goals Statement (PT) To return home, improve mobility   Pertinent History of Current Problem (include personal factors and/or comorbidities that impact the POC) Per H&P, pt is a 82 year old female with a history of Osteoporosis, MRSA, Pancreatic Cyst, Cataract, VERONICA, DM Type 2, VERONICA, Tremor, Obesity, GERD, Gastric Ulcer, HTN, HLD, Asthma, Bipolar 1 Disorder, Schizoaffective Disorder, Chronic Patellar Tendon Rupture who presented to the ED t with a fever and unwitnessed fall.   Existing Precautions/Restrictions fall   Cognition   Affect/Mental Status (Cognition) WFL   Orientation Status (Cognition) oriented x 4   Follows Commands (Cognition) WFL   Pain Assessment   Patient Currently in Pain No   Range of Motion (ROM)   ROM Comment B LE WFL   Strength (Manual Muscle Testing)   Strength (Manual Muscle Testing) Deficits observed during functional mobility   Strength Comments able to complete B SLR   Bed Mobility   Comment, (Bed Mobility) modA supine to sit   Transfers   Comment, (Transfers) modA sit <> stand with FWW   Gait/Stairs (Locomotion)   Comment, (Gait/Stairs) not assessed at eval   Balance   Balance Comments fair+ sitting EOB; fair- standing with FWW   Clinical  Impression   Criteria for Skilled Therapeutic Intervention Yes, treatment indicated   PT Diagnosis (PT) impaired functional mobility   Influenced by the following impairments decreased strength, activity tolerance   Functional limitations due to impairments impaired bed mobility, transfers, ambulation   Clinical Presentation (PT Evaluation Complexity) Stable/Uncomplicated   Clinical Presentation Rationale Pt is medically stable   Clinical Decision Making (Complexity) low complexity   Planned Therapy Interventions (PT) balance training;bed mobility training;home exercise program;gait training;patient/family education;strengthening;transfer training   Anticipated Equipment Needs at Discharge (PT) wheelchair;walker, rolling;hospital bed;commode chair   Risk & Benefits of therapy have been explained evaluation/treatment results reviewed;care plan/treatment goals reviewed;risks/benefits reviewed;current/potential barriers reviewed;participants voiced agreement with care plan;participants included;patient   PT Total Evaluation Time   PT Jon Low Complexity Minutes (11212) 7   Physical Therapy Goals   PT Frequency Daily   PT Predicted Duration/Target Date for Goal Attainment 09/01/23   PT Goals Bed Mobility;Transfers;Gait   PT: Bed Mobility Minimal assist;Supine to/from sit   PT: Transfers Minimal assist;Sit to/from stand;Assistive device   PT: Gait Minimal assist;25 feet;Assistive device   PT Discharge Planning   PT Plan progress ind with transfers, repeated sit <> stand, pivot transfers   PT Discharge Recommendation (DC Rec) home with assist;home with home care physical therapy   PT Rationale for DC Rec Pt is below baseline mobility, unsure how much facility actually assists patients with mobility. Anticipate pt would be best served to return back to LTC with HHPT to progress activity. Pt currently needing Ax1 for bed mobility and short distance pivot transfers.   PT Brief overview of current status Ax2 pivot transfers    Total Session Time   Total Session Time (sum of timed and untimed services) 7

## 2023-08-30 NOTE — PLAN OF CARE
To Do:  End of Shift Summary  For vital signs and complete assessments, please see documentation flowsheets.     Pertinent assessments: AxOx4. VSS on RA, uses CPAP when sleeping. Denies any pain or discomfort. Denies SOB. [place] wears hearing aids.     Major Shift Events: None.     Treatment Plan: Symptom management. Rocephin.     Bedside Nurse: Alaina Cross RN

## 2023-08-30 NOTE — PROGRESS NOTES
A CPAP of  10 @ 28% was applied to the pt via the mask for NOC use.  The bridge of the nose looks good and remains intact. Pt is tolerating it well. Will continue to monitor and assess the pt's current respiratory status and needs.     Horacio Paiz RT on 8/29/2023 at 10:36 PM

## 2023-08-30 NOTE — PROGRESS NOTES
Hospitalist Medicine Progress Note   [facility]       Nela Suazo is a 82 year old lady with osteoporosis, pancreatic cyst, VERONICA, T2DM, tremors, obesity, GERD, gastric ulcer, HTN, HLD, asthma, bipolar 1 disorder, schizoaffective disorder, chronic patellar tendon rupture who came to the [facility] 8/27/2023 with fever and unwitnessed fall at his facility with a temperature of 103.2  F WBC was 8.4 urine had greater than 182 WBCs with clumps of WBCs large leukocyte esterase and positive nitrates and grew E. coli in urine culture.  Patient was started on ceftriaxone 8/28/2023 to which the E. coli is sensitive magnesium was low at 1.5 which has been replaced       Date of Admission:  8/27/2023  Assessment & Plan     Sepsis due to E. coli urinary tract infection  On treatment with ceftriaxone started on 8/28/2023 to which the E. coli is sensitive to this will be changed to cefdinir and patient will be discharged home    Unwitnessed fall  Right upper extremity abrasion  Patient had a fall while transferring from her wheelchair to a chair and scraped her right forearm      HTN -   -- Blood pressure uncontrolled, will resume lisinopril and propanolol with parameters     HLD - continued ASA and Simvastatin     DM Type 2 - hgb A1C 6.5 (3/2023).  BS on admission 232.  - SS protocol     Hx Asthma - continue Symbicort.  Albuterol prn     Chronic Anemia -   --hgb 10.9 at baseline.  --Currently 8.7.  Likely from hemodilution.  No acute bleeding.  Continue to monitor hemoglobin     VERONICA - continue cpap     GERD  Hx Gastric Ulcer - continue Omeprazole     Schizoaffective Disorder  Bipolar 1 Disorder - continue Abilify, Depakote, Sertraline, Restoril     Tremor -resume propranolol     Obesity      Plan:   Discharge in a.m.  Change ceftriaxone to cefdinir    Diet: Combination Diet Regular Diet Adult    DVT Prophylaxis: Low Risk/Ambulatory with no VTE prophylaxis indicated  [place] Catheter: Not  present  Code Status: Full Code               The patient's care was discussed with the Patient .    Oliverio Barnes MD  Hospitalist Service  [facility]    ______________________________________________________________________    Interval History     Symptoms   No new symptoms    Review of Systems:   Patient feels somewhat stronger as compared to before    Data reviewed today: I reviewed all medications, new labs and imaging results over the last 24 hours.     Physical Exam   Vital Signs: Temp: 97.9  F (36.6  C) Temp src: Oral BP: (!) 151/46 Pulse: 60   Resp: 16 SpO2: 95 % O2 Device: None (Room air)    Weight: 205 lbs 7.5 oz      GENERAL: Patient is not in acute distress  HEENT: EOM+,Conjunctiva is clear   NECK:  no Jugular Venous distention  HEART: S1 S2 regular Rate and Rhythm,   LUNGS: Respirations are  not laboured, Lungs are  clear to auscultation without Crepitations or Wheezing   ABDOMEN: Soft , there is no tenderness ,Bowel Sounds are  Positive   LOWER LIMBS: Pedal Edema  Bilaterally   CNS:  Alert,  Oriented x 3, Moving all the Four Limbs      Data   Recent Labs   Lab 08/30/23  1154 08/30/23  0737 08/30/23  0620 08/29/23  0758 08/29/23  0622 08/28/23  1217 08/28/23  0924 08/28/23  0907 08/28/23  0556 08/27/23  2108 08/27/23  1637   WBC  --   --  6.0  --  7.6  --   --   --  8.7  --  8.4   HGB  --   --  8.7*  --  8.7*  --   --   --  8.9*  --  10.9*   MCV  --   --  90  --  90  --   --   --  92  --  90   PLT  --   --  150  --  140*  --   --   --  137*  --  187   INR  --   --   --   --   --   --   --   --   --   --  1.12   NA  --   --  136  --  133*  --   --   --  132*  --  129*   POTASSIUM  --   --  4.6  --  4.3  --  4.3  --  3.9  --  4.6   CHLORIDE  --   --  104  --  105  --   --   --  101  --  95*   CO2  --   --  24  --  21*  --   --   --  23  --  23   BUN  --   --  15.5  --  16.4  --   --   --  27.6*  --  25.7*   CR  --   --  0.73  --  0.76  --   --   --  1.04*  --  1.06*   ANIONGAP  --    --  8  --  7  --   --   --  8  --  11   MAIA  --   --  9.2  --  8.7*  --   --   --  8.7*  --  9.6   * 134* 136*   < > 135*   < >  --    < > 129*   < > 232*   ALBUMIN  --   --   --   --   --   --   --   --   --   --  3.7   PROTTOTAL  --   --   --   --   --   --   --   --   --   --  6.9   BILITOTAL  --   --   --   --   --   --   --   --   --   --  0.3   ALKPHOS  --   --   --   --   --   --   --   --   --   --  81   ALT  --   --   --   --   --   --   --   --   --   --  7   AST  --   --   --   --   --   --   --   --   --   --  18    < > = values in this interval not displayed.         No results found for this or any previous visit (from the past 24 hour(s)).

## 2023-08-30 NOTE — PLAN OF CARE
To Do:  End of Shift Summary  For vital signs and complete assessments, please see documentation flowsheets.     Pertinent assessments: A/Ox4. On RA, CPAP when sleeping. Denies any pain or discomfort. Has HA. Slept well on shift, purewick in place. Possible discharge today.     Major Shift Events: None.     Treatment Plan: Symptom management. Rocephin.     Bedside Nurse: Jumana Murphy RN

## 2023-08-31 ENCOUNTER — LAB REQUISITION (OUTPATIENT)
Dept: LAB | Facility: CLINIC | Age: 83
End: 2023-08-31
Payer: COMMERCIAL

## 2023-08-31 VITALS
HEIGHT: 66 IN | WEIGHT: 205.47 LBS | HEART RATE: 65 BPM | OXYGEN SATURATION: 95 % | DIASTOLIC BLOOD PRESSURE: 50 MMHG | SYSTOLIC BLOOD PRESSURE: 140 MMHG | BODY MASS INDEX: 33.02 KG/M2 | TEMPERATURE: 97.6 F | RESPIRATION RATE: 16 BRPM

## 2023-08-31 DIAGNOSIS — F25.0 SCHIZOAFFECTIVE DISORDER, BIPOLAR TYPE (H): ICD-10-CM

## 2023-08-31 DIAGNOSIS — I10 ESSENTIAL (PRIMARY) HYPERTENSION: ICD-10-CM

## 2023-08-31 DIAGNOSIS — G47.00 INSOMNIA, UNSPECIFIED: ICD-10-CM

## 2023-08-31 DIAGNOSIS — J44.9 CHRONIC OBSTRUCTIVE PULMONARY DISEASE, UNSPECIFIED (H): ICD-10-CM

## 2023-08-31 DIAGNOSIS — E11.21 TYPE 2 DIABETES MELLITUS WITH DIABETIC NEPHROPATHY (H): ICD-10-CM

## 2023-08-31 LAB
GLUCOSE BLDC GLUCOMTR-MCNC: 112 MG/DL (ref 70–99)
GLUCOSE BLDC GLUCOMTR-MCNC: 128 MG/DL (ref 70–99)
GLUCOSE BLDC GLUCOMTR-MCNC: 134 MG/DL (ref 70–99)
MAGNESIUM SERPL-MCNC: 2.1 MG/DL (ref 1.7–2.3)
POTASSIUM SERPL-SCNC: 4.9 MMOL/L (ref 3.4–5.3)

## 2023-08-31 PROCEDURE — 250N000013 HC RX MED GY IP 250 OP 250 PS 637: Performed by: STUDENT IN AN ORGANIZED HEALTH CARE EDUCATION/TRAINING PROGRAM

## 2023-08-31 PROCEDURE — 999N000157 HC STATISTIC RCP TIME EA 10 MIN

## 2023-08-31 PROCEDURE — 250N000013 HC RX MED GY IP 250 OP 250 PS 637: Performed by: INTERNAL MEDICINE

## 2023-08-31 PROCEDURE — 84132 ASSAY OF SERUM POTASSIUM: CPT | Performed by: INTERNAL MEDICINE

## 2023-08-31 PROCEDURE — 94640 AIRWAY INHALATION TREATMENT: CPT

## 2023-08-31 PROCEDURE — 250N000013 HC RX MED GY IP 250 OP 250 PS 637: Performed by: HOSPITALIST

## 2023-08-31 PROCEDURE — 99238 HOSP IP/OBS DSCHRG MGMT 30/<: CPT | Performed by: INTERNAL MEDICINE

## 2023-08-31 PROCEDURE — 36415 COLL VENOUS BLD VENIPUNCTURE: CPT | Performed by: INTERNAL MEDICINE

## 2023-08-31 PROCEDURE — 250N000013 HC RX MED GY IP 250 OP 250 PS 637: Performed by: PHYSICIAN ASSISTANT

## 2023-08-31 PROCEDURE — 83735 ASSAY OF MAGNESIUM: CPT | Performed by: INTERNAL MEDICINE

## 2023-08-31 PROCEDURE — 94660 CPAP INITIATION&MGMT: CPT

## 2023-08-31 RX ORDER — AMOXICILLIN 500 MG/1
2000 CAPSULE ORAL PRN
Start: 2023-08-31

## 2023-08-31 RX ORDER — CEFDINIR 300 MG/1
300 CAPSULE ORAL EVERY 12 HOURS
Qty: 8 CAPSULE | Refills: 0 | Status: SHIPPED | OUTPATIENT
Start: 2023-08-31 | End: 2023-09-04

## 2023-08-31 RX ORDER — HYDRALAZINE HYDROCHLORIDE 25 MG/1
25 TABLET, FILM COATED ORAL ONCE
Status: COMPLETED | OUTPATIENT
Start: 2023-08-31 | End: 2023-08-31

## 2023-08-31 RX ADMIN — PROPRANOLOL HYDROCHLORIDE 40 MG: 40 TABLET ORAL at 16:07

## 2023-08-31 RX ADMIN — PANTOPRAZOLE SODIUM 40 MG: 40 TABLET, DELAYED RELEASE ORAL at 08:11

## 2023-08-31 RX ADMIN — CEFDINIR 300 MG: 300 CAPSULE ORAL at 08:11

## 2023-08-31 RX ADMIN — PROPRANOLOL HYDROCHLORIDE 40 MG: 40 TABLET ORAL at 08:11

## 2023-08-31 RX ADMIN — POLYETHYLENE GLYCOL 3350 17 G: 17 POWDER, FOR SOLUTION ORAL at 08:11

## 2023-08-31 RX ADMIN — FLUTICASONE FUROATE AND VILANTEROL TRIFENATATE 1 PUFF: 100; 25 POWDER RESPIRATORY (INHALATION) at 07:39

## 2023-08-31 RX ADMIN — LISINOPRIL 20 MG: 20 TABLET ORAL at 08:11

## 2023-08-31 RX ADMIN — BUPROPION HYDROCHLORIDE 100 MG: 100 TABLET, FILM COATED, EXTENDED RELEASE ORAL at 08:11

## 2023-08-31 RX ADMIN — SERTRALINE HYDROCHLORIDE 200 MG: 100 TABLET ORAL at 08:11

## 2023-08-31 RX ADMIN — HYDRALAZINE HYDROCHLORIDE 25 MG: 25 TABLET, FILM COATED ORAL at 00:48

## 2023-08-31 RX ADMIN — SERTRALINE HYDROCHLORIDE 100 MG: 100 TABLET ORAL at 13:12

## 2023-08-31 ASSESSMENT — ACTIVITIES OF DAILY LIVING (ADL)
ADLS_ACUITY_SCORE: 36

## 2023-08-31 NOTE — PLAN OF CARE
To Do:  End of Shift Summary  For vital signs and complete assessments, please see documentation flowsheets.     Pertinent assessments: A/Ox4. Using CPAP, high BP, doc paged. Oral PRN Hydralazine ordered do to loss of IV access earlier. [place]. Denies pain or discomfort.     Major Shift Events: PRN PO Hydralazine given x2 for high BP.    Treatment Plan: Monitor VS. PO abx. Discharging to day to long term care.     Bedside Nurse: Jumana Murphy RN

## 2023-08-31 NOTE — DISCHARGE SUMMARY
"[facility]  Hospitalist Discharge Summary      Date of Admission:  8/27/2023  Date of Discharge:  8/31/2023  Discharging Provider: Oliverio Barnes MD  Discharge Service: Hospitalist Service    Discharge Diagnoses   Sepsis due to E. coli urinary tract infection   Generalized weakness  Unwitnessed fall  Right upper extremity abrasion  Hypertension  Hypercholesterolemia  Type 2 diabetes mellitus  Asthma  Chronic anemia  Obstructive sleep apnea  GERD  Gastric ulcer history  Schizoaffective disorder  Bipolar 1 disorder   Benign essential tremors  Obesity      Clinically Significant Risk Factors     # DMII: A1C = N/A within past 6 months  # Obesity: Estimated body mass index is 33.16 kg/m  as calculated from the following:    Height as of this encounter: 1.676 m (5' 6\").    Weight as of this encounter: 93.2 kg (205 lb 7.5 oz).       Follow-ups Needed After Discharge   Follow-up Appointments     Follow Up and recommended labs and tests      Follow up with skilled nursing physician.  The following labs/tests are   recommended: BMP and CBC.            Unresulted Labs Ordered in the Past 30 Days of this Admission       Date and Time Order Name Status Description    8/27/2023  5:29 PM Blood Culture Arm, Left Preliminary     8/27/2023  4:39 PM Blood Culture Peripheral Blood Preliminary         These results will be followed up by Ramonita Jones      Discharge Disposition   Discharged to nursing home  Condition at discharge: Stable    Hospital Course   Nela Suazo is a 82 year old lady with osteoporosis, pancreatic cyst, VERONICA, T2DM, tremors, obesity, GERD, gastric ulcer, HTN, HLD, asthma, bipolar 1 disorder, schizoaffective disorder, chronic patellar tendon rupture who came to the [facility] 8/27/2023 with fever and unwitnessed fall at his facility with a temperature of 103.2  F WBC was 8.4 urine had greater than 182 WBCs with clumps of WBCs large leukocyte esterase and positive nitrates and " grew E. coli in urine culture.  Patient was started on ceftriaxone 8/28/2023 to which the E. coli is sensitive this was changed to Cefdinir.  Magnesium was low at 1.5 which has been replaced     Consultations This Hospital Stay   CARE MANAGEMENT / SOCIAL WORK IP CONSULT  PHYSICAL THERAPY ADULT IP CONSULT  PHYSICAL THERAPY ADULT IP CONSULT  OCCUPATIONAL THERAPY ADULT IP CONSULT    Code Status   Full Code    Time Spent on this Encounter   I, Oliverio Barnes MD, personally saw the patient today and spent less than or equal to 30 minutes discharging this patient.       Oliverio Barnes MD  [place] [address] [facility]  [address] [place] [address]  Phone: [phone]  Fax: [phone]  ______________________________________________________________________    Physical Exam   Vital Signs: Temp: 97.6  F (36.4  C) Temp src: Oral BP: (!) 166/69 Pulse: 69   Resp: 16 SpO2: 95 % O2 Device: None (Room air)    Weight: 205 lbs 7.5 oz    GENERAL: Patient is not in acute distress  HEENT: EOM+,Conjunctiva is clear   NECK:  no Jugular Venous distention  HEART: S1 S2 regular Rate and Rhythm,   LUNGS: Respirations are  not laboured, Lungs are  clear to auscultation without Crepitations or Wheezing   ABDOMEN: Soft , there is no tenderness ,Bowel Sounds are  Positive   LOWER LIMBS: Pedal Edema  Bilaterally   CNS:  Alert,  Oriented x 3, Moving all the Four Limbs            Primary Care Physician   Ramonita Jones    Discharge Orders      General info for SNF    Length of Stay Estimate: Short Term Care: Estimated # of Days <30  Condition at Discharge: Improving  Level of care:skilled   Rehabilitation Potential: Good  Admission H&P remains valid and up-to-date: Yes  Recent Chemotherapy: N/A  Use Nursing Home Standing Orders: Yes     Mantoux instructions    Give two-step Mantoux (PPD) Per Facility Policy Yes     Follow Up and recommended labs and tests    Follow up with [facility]DC physician.  The following  labs/tests are recommended: BMP and CBC.     Reason for your hospital stay    came to the [facility] 8/27/2023 with fever and unwitnessed fall at his facility with a temperature of 103.2  F WBC was 8.4 urine had greater than 182 WBCs with clumps of WBCs large leukocyte esterase and positive nitrates and grew E. coli in urine     Intake and output    Every shift     Activity - Up with assistive device     Physical Therapy Adult Consult    Evaluate and treat as clinically indicated.    Reason: Generalized weakness     Occupational Therapy Adult Consult    Evaluate and treat as clinically indicated.    Reason: Generalized weakness     Fall precautions     Diet    Follow this diet upon discharge: Orders Placed This Encounter      Combination Diet diabetic cardiac diet Adult       Significant Results and Procedures   Most Recent 3 CBC's:  Recent Labs   Lab Test 09/01/23  0735 08/30/23  0620 08/29/23  0622   WBC 5.7 6.0 7.6   HGB 9.2* 8.7* 8.7*   MCV 92 90 90    150 140*     Most Recent 3 BMP's:  Recent Labs   Lab Test 09/01/23  0735 08/31/23  1218 08/31/23  0815 08/31/23  0604 08/30/23  0737 08/30/23  0620 08/29/23  0758 08/29/23  0622     --   --   --   --  136  --  133*   POTASSIUM 4.9  --   --  4.9  --  4.6  --  4.3   CHLORIDE 103  --   --   --   --  104  --  105   CO2 24  --   --   --   --  24  --  21*   BUN 23.9*  --   --   --   --  15.5  --  16.4   CR 0.72  --   --   --   --  0.73  --  0.76   ANIONGAP 11  --   --   --   --  8  --  7   MAIA 9.8  --   --   --   --  9.2  --  8.7*   * 112* 128*  --    < > 136*   < > 135*    < > = values in this interval not displayed.     7-Day Micro Results       Collected Updated Procedure Result Status      08/27/2023 1740 08/31/2023 1931 Blood Culture Arm, Left [[id number]]   Blood from Arm, Left    Preliminary result Component Value   Culture No growth after 4 days  [P]                08/27/2023 1639 08/27/2023 1750 Symptomatic Influenza A/B,  RSV, & SARS-CoV2 PCR (COVID-19) Nasopharyngeal [[id number]]    Swab from Nasopharyngeal    Final result Component Value   Influenza A PCR Negative   Influenza B PCR Negative   RSV PCR Negative   SARS CoV2 PCR Negative   NEGATIVE: SARS-CoV-2 (COVID-19) RNA not detected, presumed negative.            08/27/2023 1637 08/31/2023 1931 Blood Culture Peripheral Blood [[id number]]   Peripheral Blood    Preliminary result Component Value   Culture No growth after 4 days  [P]                08/27/2023 1637 08/28/2023 2307 Urine Culture [[id number]]    (Abnormal)   Urine, Catheter    Final result Component Value   Culture >100,000 CFU/mL Escherichia coli        Susceptibility        Escherichia coli      ELAN      Ampicillin >=32 ug/mL Resistant      Ampicillin/ Sulbactam 16 ug/mL Intermediate      Cefazolin <=4 ug/mL Susceptible  [1]       Cefepime <=1 ug/mL Susceptible      Cefoxitin <=4 ug/mL Susceptible      Ceftazidime <=1 ug/mL Susceptible      Ceftriaxone <=1 ug/mL Susceptible      Ciprofloxacin <=0.25 ug/mL Susceptible      Gentamicin <=1 ug/mL Susceptible      Levofloxacin <=0.12 ug/mL Susceptible      Nitrofurantoin 32 ug/mL Susceptible      Piperacillin/Tazobactam <=4 ug/mL Susceptible      Tobramycin <=1 ug/mL Susceptible      Trimethoprim/Sulfamethoxazole >16/304 ug/mL Resistant                   [1]  Cefazolin ELAN breakpoints are for the treatment of uncomplicated urinary tract infections. For the treatment of systemic infections, please contact the laboratory for additional testing.                         Most Recent Urinalysis:  Recent Labs   Lab Test 08/27/23  1637 03/21/19  1015   COLOR Yellow Yellow   APPEARANCE Cloudy* Turbid*   URINEGLC Negative Negative   URINEBILI Negative Negative   URINEKETONE Negative Negative   SG 1.015 1.032*   UBLD Large* Moderate*   URINEPH 6.0 6.0   PROTEIN 300* 100 mg/dL*   UROBILINOGEN  --  <2.0 E.U./dL   NITRITE Positive* Positive*   LEUKEST Large* Large*   RBCU 106*  10-25*   WBCU >182* >100*   ,   Results for orders placed or performed during the hospital encounter of 08/27/23   XR Chest 2 Views    Narrative    EXAM: XR CHEST 2 VIEWS  LOCATION: [facility]  DATE: 8/27/2023    INDICATION: fever  COMPARISON: 11/7/2014      Impression    IMPRESSION: Heart is normal in size. Lungs are clear.   XR Wrist Right G/E 3 Views    Narrative    EXAM: XR FOREARM RIGHT 2 VIEWS, XR WRIST RIGHT G/E 3 VIEWS  LOCATION: [facility]  DATE: 8/27/2023    INDICATION: Arm pain after a fall.  COMPARISON: None.      Impression    IMPRESSION: Normal joint alignment. No fracture. 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   Radius/Ulna XR, PA & LAT, right    Narrative    EXAM: XR FOREARM RIGHT 2 VIEWS, XR WRIST RIGHT G/E 3 VIEWS  LOCATION: [facility]  DATE: 8/27/2023    INDICATION: Arm pain after a fall.  COMPARISON: None.      Impression    IMPRESSION: Normal joint alignment. No fracture. 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       Discharge Medications   Current Discharge Medication List        START taking these medications    Details   cefdinir (OMNICEF) 300 MG capsule Take 1 capsule (300 mg) by mouth every 12 hours for 4 days  Qty: 8 capsule, Refills: 0    Associated Diagnoses: Acute UTI           CONTINUE these medications which have CHANGED    Details   amoxicillin (AMOXIL) 500 MG capsule Take 4 capsules (2,000 mg) by mouth as needed (Before dental procedure) 2000 Grams before dental procedures    Associated Diagnoses: Other dental procedure status           CONTINUE these medications which have NOT CHANGED    Details   ACETAMINOPHEN PO Take 1,000 mg by mouth 2 times daily      alpha-d-galactosidase (BEANO) tablet Take 2 tablets by mouth 2 times daily (before meals)      ARIPiprazole (ABILIFY) 30 MG tablet Take 30 mg by mouth daily.       aspirin (ASPIRIN EC LO-DOSE) 81 MG EC tablet Take 81 mg by mouth every other day       buPROPion (WELLBUTRIN SR) 100 MG 12 hr tablet Take 100 mg by mouth daily      capsaicin (ZOSTRIX) 0.025 % CREA Apply 1 applicator topically 3 times daily Apply to left shoulder and lower back/spine.      Cholecalciferol (VITAMIN D3) 1000 UNITS CAPS Take 2,000 Units by mouth      !! divalproex (DEPAKOTE ER) 250 MG 24 hr tablet Take 250 mg by mouth every evening Give in addition to 1000mg dose at pm Total daily = 1250mg.      !! divalproex (DEPAKOTE ER) 500 MG 24 hr tablet Take 1,000 mg by mouth every evening Give with 250mg at pm      fluticasone (FLONASE ALLERGY RELIEF) 50 MCG/ACT spray Spray 1 spray into both nostrils 2 times daily      fluticasone-salmeterol (ADVAIR HFA) 115-21 MCG/ACT inhaler Inhale 1 puff into the lungs 2 times daily      ipratropium (ATROVENT) 0.03 % spray Spray 2 sprays into both nostrils 4 times daily      lisinopril (ZESTRIL) 20 MG tablet Take 20 mg by mouth daily      Magnesium Hydroxide (MILK OF MAGNESIA PO) Take 30 mLs by mouth daily as needed      Omeprazole 20 MG tablet Take 20 mg by mouth daily      polyethylene glycol (MIRALAX/GLYCOLAX) powder Take 17 g by mouth daily      polyvinyl alcohol (LIQUIFILM TEARS) 1.4 % ophthalmic solution Place 1 drop into both eyes 4 times daily      propranolol (INDERAL) 40 MG tablet Take 40 mg by mouth 3 times daily      !! senna-docusate (SENOKOT-S/PERICOLACE) 8.6-50 MG tablet Take 3 tablets by mouth every evening      !! senna-docusate (SENOKOT-S/PERICOLACE) 8.6-50 MG tablet Take 1 tablet by mouth 2 times daily as needed for constipation      !! sertraline (ZOLOFT) 100 MG tablet Take 100 mg by mouth daily at 2 pm      !! sertraline (ZOLOFT) 100 MG tablet Take 200 mg by mouth every morning      simvastatin (ZOCOR) 20 MG tablet Take 10 mg by mouth every evening      temazepam (RESTORIL) 15 MG capsule Take 15 mg by mouth every evening      zinc oxide (DESITIN) 40 %  paste Apply topically 2 times daily Under breast and abdominal folds      albuterol (2.5 MG/3ML) 0.083% nebulizer solution Take 1 ampule by nebulization every 4 hours as needed       Calcium Carbonate Antacid (TUMS PO) Take 1 chew tab by mouth 4 times daily as needed       ipratropium (ATROVENT) 0.02 % nebulizer solution Take 0.5 mg by nebulization every 4 hours as needed (Obstructive chronic airway)      loperamide (IMODIUM A-D) 2 MG tablet Take 2 mg by mouth 4 times daily as needed (up to 8mg/24 hours)       ORDER FOR DME 2 L.       !! - Potential duplicate medications found. Please discuss with provider.        STOP taking these medications       alum & mag hydroxide-simethicone (MYLANTA/MAALOX) 200-200-20 MG/5ML SUSP Comments:   Reason for Stopping:         Dextromethorphan-Guaifenesin (ROBITUSSIN DM PO) Comments:   Reason for Stopping:         guaiFENesin (ROBITUSSIN) 100 MG/5ML SYRP Comments:   Reason for Stopping:         nystatin (MYCOSTATIN) 824170 UNIT/GM POWD Comments:   Reason for Stopping:             Allergies   Allergies   Allergen Reactions    Ace Inhibitors     Codeine Sulfate     Influenza Vac Typ     Oxycodone

## 2023-08-31 NOTE — PLAN OF CARE
Goal Outcome Evaluation:    Plan of Care Reviewed With: patient    Pertinent assessments: A&Ox4, [place].  VSS, On RA. Denied pain or discomfort. Assist x2, Up in her chair. External catheter in place. On regular diet & tolerating well. No IV access. Rocephin d/c'd. Started Cedinir PO Afebrile. Bg were 121 and 145. No coverage needed. On  CPAP at [facility].     Major Shift Events: Lost IV access. Attempted x4 with RN flyer as well. Switched to PO abx    Treatment Plan: Oral Abx, Pt consult, Discharging tomorrow to [facility]

## 2023-08-31 NOTE — PROVIDER NOTIFICATION
MD paged at midnight about /73. Pt lost IV access earlier in day, unable to give PRN IV Hydralazine. MD changed order to PO Hydralazine 25 mg, given at 12:48 a.m.     MD paged again at 3:38 a.m that BP was 187/64. MD did not provide any new orders.

## 2023-08-31 NOTE — PLAN OF CARE
Physical Therapy Discharge Summary    Reason for therapy discharge:    Discharged to long term care facility.    Progress towards therapy goal(s). See goals on Care Plan in [facility] electronic health record for goal details.  Goals partially met.  Barriers to achieving goals:   discharge from facility.    Therapy recommendation(s):    Continued therapy is recommended.  Rationale/Recommendations:  Home PT to maximize return to PLOF.

## 2023-08-31 NOTE — DISCHARGE SUMMARY
Discharge Note    Patient discharged to Nursing Home via transportation service  accompanied by no family/friend .  IV: Discontinued  Prescriptions sent with patient to discharge facility .   Belongings reviewed and sent with patient.   Home medications returned to patient: NA  Equipment sent with: patient, N/A.   patient verbalizes understanding of discharge instructions. AVS given to patient.  Additional education completed?     Transportation service given paper work and explained to Pt.

## 2023-08-31 NOTE — PROGRESS NOTES
Care Management Discharge Note    Discharge Date: 08/31/2023       Discharge Disposition: Long Term Care    Discharge Services:  Rehab    Discharge DME:  [facility]    Discharge Transportation: agency    Private pay costs discussed: Not applicable      Education Provided on the Discharge Plan:  yes  Persons Notified of Discharge Plans: patient  Patient/Family in Agreement with the Plan:  yes    Handoff Referral Completed: Yes    Additional Information:  Patient is discharging back to [facility] today. [facility] transport set up for today at [phone]. Updated [facility] that she is discharging and transport time. Updated bedside nurse and charge of transport time.   Faxed discharge orders to [facility] and called and left message to confirm receipt.     Latanya Gutierrez RN  Care Coordinator  [facility]

## 2023-08-31 NOTE — PROGRESS NOTES
Patient lost IV access and was not able to be obtained after several attempts. Currently on ceftriaxone for UTI. Changing to cefdinir as was previously discussed in daily progress note.    Mohsen Tse MD  Hospitalist

## 2023-08-31 NOTE — PROGRESS NOTES
A CPAP of  10 @ 28% was applied to the pt via the mask for NOC use.  The bridge of the nose looks good and remains intact. Pt is tolerating it well. Will continue to monitor and assess the pt's current respiratory status and needs.     Horacio Paiz RT on 8/30/2023 at 10:16 PM

## 2023-09-01 LAB
ANION GAP SERPL CALCULATED.3IONS-SCNC: 11 MMOL/L (ref 7–15)
BACTERIA BLD CULT: NO GROWTH
BACTERIA BLD CULT: NO GROWTH
BUN SERPL-MCNC: 23.9 MG/DL (ref 8–23)
BURR CELLS BLD QL SMEAR: ABNORMAL
CALCIUM SERPL-MCNC: 9.8 MG/DL (ref 8.8–10.2)
CHLORIDE SERPL-SCNC: 103 MMOL/L (ref 98–107)
CREAT SERPL-MCNC: 0.72 MG/DL (ref 0.51–0.95)
DEPRECATED HCO3 PLAS-SCNC: 24 MMOL/L (ref 22–29)
ERYTHROCYTE [DISTWIDTH] IN BLOOD BY AUTOMATED COUNT: 13.6 % (ref 10–15)
GFR SERPL CREATININE-BSD FRML MDRD: 83 ML/MIN/1.73M2
GLUCOSE SERPL-MCNC: 109 MG/DL (ref 70–99)
HCT VFR BLD AUTO: 29.2 % (ref 35–47)
HGB BLD-MCNC: 9.2 G/DL (ref 11.7–15.7)
MCH RBC QN AUTO: 28.9 PG (ref 26.5–33)
MCHC RBC AUTO-ENTMCNC: 31.5 G/DL (ref 31.5–36.5)
MCV RBC AUTO: 92 FL (ref 78–100)
PLAT MORPH BLD: ABNORMAL
PLATELET # BLD AUTO: 216 10E3/UL (ref 150–450)
POLYCHROMASIA BLD QL SMEAR: SLIGHT
POTASSIUM SERPL-SCNC: 4.9 MMOL/L (ref 3.4–5.3)
RBC # BLD AUTO: 3.18 10E6/UL (ref 3.8–5.2)
RBC MORPH BLD: ABNORMAL
SODIUM SERPL-SCNC: 138 MMOL/L (ref 136–145)
WBC # BLD AUTO: 5.7 10E3/UL (ref 4–11)

## 2023-09-01 PROCEDURE — 85027 COMPLETE CBC AUTOMATED: CPT | Mod: ORL | Performed by: FAMILY MEDICINE

## 2023-09-01 PROCEDURE — P9604 ONE-WAY ALLOW PRORATED TRIP: HCPCS | Mod: ORL | Performed by: FAMILY MEDICINE

## 2023-09-01 PROCEDURE — 80048 BASIC METABOLIC PNL TOTAL CA: CPT | Mod: ORL | Performed by: FAMILY MEDICINE

## 2023-09-01 PROCEDURE — 36415 COLL VENOUS BLD VENIPUNCTURE: CPT | Mod: ORL | Performed by: FAMILY MEDICINE

## 2023-09-02 ENCOUNTER — LAB REQUISITION (OUTPATIENT)
Dept: LAB | Facility: CLINIC | Age: 83
End: 2023-09-02
Payer: COMMERCIAL

## 2023-09-02 DIAGNOSIS — I10 ESSENTIAL (PRIMARY) HYPERTENSION: ICD-10-CM

## 2023-09-05 LAB
ANION GAP SERPL CALCULATED.3IONS-SCNC: 11 MMOL/L (ref 7–15)
BUN SERPL-MCNC: 18.2 MG/DL (ref 8–23)
CALCIUM SERPL-MCNC: 10.3 MG/DL (ref 8.8–10.2)
CHLORIDE SERPL-SCNC: 105 MMOL/L (ref 98–107)
CREAT SERPL-MCNC: 0.87 MG/DL (ref 0.51–0.95)
DEPRECATED HCO3 PLAS-SCNC: 24 MMOL/L (ref 22–29)
ERYTHROCYTE [DISTWIDTH] IN BLOOD BY AUTOMATED COUNT: 13.7 % (ref 10–15)
GFR SERPL CREATININE-BSD FRML MDRD: 66 ML/MIN/1.73M2
GLUCOSE SERPL-MCNC: 132 MG/DL (ref 70–99)
HCT VFR BLD AUTO: 31.1 % (ref 35–47)
HGB BLD-MCNC: 9.6 G/DL (ref 11.7–15.7)
MCH RBC QN AUTO: 29.1 PG (ref 26.5–33)
MCHC RBC AUTO-ENTMCNC: 30.9 G/DL (ref 31.5–36.5)
MCV RBC AUTO: 94 FL (ref 78–100)
PLATELET # BLD AUTO: 335 10E3/UL (ref 150–450)
POTASSIUM SERPL-SCNC: 4.6 MMOL/L (ref 3.4–5.3)
RBC # BLD AUTO: 3.3 10E6/UL (ref 3.8–5.2)
SODIUM SERPL-SCNC: 140 MMOL/L (ref 136–145)
WBC # BLD AUTO: 7.4 10E3/UL (ref 4–11)

## 2023-09-05 PROCEDURE — P9604 ONE-WAY ALLOW PRORATED TRIP: HCPCS | Mod: ORL | Performed by: FAMILY MEDICINE

## 2023-09-05 PROCEDURE — 80048 BASIC METABOLIC PNL TOTAL CA: CPT | Mod: ORL | Performed by: FAMILY MEDICINE

## 2023-09-05 PROCEDURE — 36415 COLL VENOUS BLD VENIPUNCTURE: CPT | Mod: ORL | Performed by: FAMILY MEDICINE

## 2023-09-05 PROCEDURE — 85027 COMPLETE CBC AUTOMATED: CPT | Mod: ORL | Performed by: FAMILY MEDICINE

## 2023-11-05 ENCOUNTER — HEALTH MAINTENANCE LETTER (OUTPATIENT)
Age: 83
End: 2023-11-05

## 2023-11-06 ENCOUNTER — HOSPITAL ENCOUNTER (OUTPATIENT)
Dept: MAMMOGRAPHY | Facility: CLINIC | Age: 83
Discharge: HOME | End: 2023-11-06
Attending: FAMILY MEDICINE | Admitting: FAMILY MEDICINE
Payer: COMMERCIAL

## 2023-11-06 DIAGNOSIS — Z12.31 VISIT FOR SCREENING MAMMOGRAM: ICD-10-CM

## 2023-11-06 PROCEDURE — 77067 SCR MAMMO BI INCL CAD: CPT

## 2023-11-08 ENCOUNTER — LAB REQUISITION (OUTPATIENT)
Dept: LAB | Facility: CLINIC | Age: 83
End: 2023-11-08
Payer: COMMERCIAL

## 2023-11-08 DIAGNOSIS — I10 ESSENTIAL (PRIMARY) HYPERTENSION: ICD-10-CM

## 2023-11-08 DIAGNOSIS — E55.9 VITAMIN D DEFICIENCY, UNSPECIFIED: ICD-10-CM

## 2023-11-08 DIAGNOSIS — D64.9 ANEMIA, UNSPECIFIED: ICD-10-CM

## 2023-11-08 DIAGNOSIS — E11.21 TYPE 2 DIABETES MELLITUS WITH DIABETIC NEPHROPATHY (H): ICD-10-CM

## 2023-11-09 LAB
ANION GAP SERPL CALCULATED.3IONS-SCNC: 11 MMOL/L (ref 7–15)
BUN SERPL-MCNC: 24.8 MG/DL (ref 8–23)
CALCIUM SERPL-MCNC: 9.5 MG/DL (ref 8.8–10.2)
CHLORIDE SERPL-SCNC: 100 MMOL/L (ref 98–107)
CREAT SERPL-MCNC: 0.87 MG/DL (ref 0.51–0.95)
DEPRECATED HCO3 PLAS-SCNC: 23 MMOL/L (ref 22–29)
EGFRCR SERPLBLD CKD-EPI 2021: 66 ML/MIN/1.73M2
ERYTHROCYTE [DISTWIDTH] IN BLOOD BY AUTOMATED COUNT: 13.6 % (ref 10–15)
GLUCOSE SERPL-MCNC: 116 MG/DL (ref 70–99)
HBA1C MFR BLD: 6.5 %
HCT VFR BLD AUTO: 30.9 % (ref 35–47)
HGB BLD-MCNC: 10.1 G/DL (ref 11.7–15.7)
MCH RBC QN AUTO: 30 PG (ref 26.5–33)
MCHC RBC AUTO-ENTMCNC: 32.7 G/DL (ref 31.5–36.5)
MCV RBC AUTO: 92 FL (ref 78–100)
PLATELET # BLD AUTO: 203 10E3/UL (ref 150–450)
POTASSIUM SERPL-SCNC: 4.9 MMOL/L (ref 3.4–5.3)
RBC # BLD AUTO: 3.37 10E6/UL (ref 3.8–5.2)
SODIUM SERPL-SCNC: 134 MMOL/L (ref 135–145)
VIT D+METAB SERPL-MCNC: 54 NG/ML (ref 20–50)
WBC # BLD AUTO: 4.8 10E3/UL (ref 4–11)

## 2023-11-09 PROCEDURE — 36415 COLL VENOUS BLD VENIPUNCTURE: CPT | Mod: ORL | Performed by: FAMILY MEDICINE

## 2023-11-09 PROCEDURE — 83036 HEMOGLOBIN GLYCOSYLATED A1C: CPT | Mod: ORL | Performed by: FAMILY MEDICINE

## 2023-11-09 PROCEDURE — P9603 ONE-WAY ALLOW PRORATED MILES: HCPCS | Mod: ORL | Performed by: FAMILY MEDICINE

## 2023-11-09 PROCEDURE — 80048 BASIC METABOLIC PNL TOTAL CA: CPT | Mod: ORL | Performed by: FAMILY MEDICINE

## 2023-11-09 PROCEDURE — 82306 VITAMIN D 25 HYDROXY: CPT | Mod: ORL | Performed by: FAMILY MEDICINE

## 2023-11-09 PROCEDURE — 85027 COMPLETE CBC AUTOMATED: CPT | Mod: ORL | Performed by: FAMILY MEDICINE

## 2024-01-25 ENCOUNTER — LAB REQUISITION (OUTPATIENT)
Dept: LAB | Facility: CLINIC | Age: 84
End: 2024-01-25
Payer: COMMERCIAL

## 2024-01-25 DIAGNOSIS — F25.0 SCHIZOAFFECTIVE DISORDER, BIPOLAR TYPE (H): ICD-10-CM

## 2024-01-26 ENCOUNTER — LAB REQUISITION (OUTPATIENT)
Dept: LAB | Facility: CLINIC | Age: 84
End: 2024-01-26

## 2024-01-26 DIAGNOSIS — F25.0 SCHIZOAFFECTIVE DISORDER, BIPOLAR TYPE (H): ICD-10-CM

## 2024-01-26 LAB — VALPROATE SERPL-MCNC: 64.5 UG/ML

## 2024-01-26 PROCEDURE — P9604 ONE-WAY ALLOW PRORATED TRIP: HCPCS | Mod: ORL | Performed by: FAMILY MEDICINE

## 2024-01-26 PROCEDURE — 36415 COLL VENOUS BLD VENIPUNCTURE: CPT | Mod: ORL | Performed by: FAMILY MEDICINE

## 2024-01-26 PROCEDURE — 80164 ASSAY DIPROPYLACETIC ACD TOT: CPT | Mod: ORL | Performed by: FAMILY MEDICINE

## 2024-02-20 PROCEDURE — 87807 RSV ASSAY W/OPTIC: CPT | Mod: ORL | Performed by: FAMILY MEDICINE

## 2024-02-20 PROCEDURE — 87804 INFLUENZA ASSAY W/OPTIC: CPT | Mod: ORL | Performed by: FAMILY MEDICINE

## 2024-02-21 ENCOUNTER — LAB REQUISITION (OUTPATIENT)
Dept: LAB | Facility: CLINIC | Age: 84
End: 2024-02-21
Payer: COMMERCIAL

## 2024-02-21 DIAGNOSIS — R05.9 COUGH, UNSPECIFIED: ICD-10-CM

## 2024-02-21 LAB
FLUAV AG SPEC QL IA: NEGATIVE
FLUBV AG SPEC QL IA: NEGATIVE
RSV AG SPEC QL: NEGATIVE

## 2024-03-24 ENCOUNTER — HEALTH MAINTENANCE LETTER (OUTPATIENT)
Age: 84
End: 2024-03-24

## 2024-08-11 ENCOUNTER — HEALTH MAINTENANCE LETTER (OUTPATIENT)
Age: 84
End: 2024-08-11

## 2024-11-09 ENCOUNTER — LAB REQUISITION (OUTPATIENT)
Dept: LAB | Facility: CLINIC | Age: 84
End: 2024-11-09
Payer: COMMERCIAL

## 2024-11-09 DIAGNOSIS — Z79.899 OTHER LONG TERM (CURRENT) DRUG THERAPY: ICD-10-CM

## 2024-11-11 LAB
CHOLEST SERPL-MCNC: 160 MG/DL
EST. AVERAGE GLUCOSE BLD GHB EST-MCNC: 131 MG/DL
FASTING STATUS PATIENT QL REPORTED: ABNORMAL
HBA1C MFR BLD: 6.2 %
HDLC SERPL-MCNC: 39 MG/DL
LDLC SERPL CALC-MCNC: 93 MG/DL
NONHDLC SERPL-MCNC: 121 MG/DL
TRIGL SERPL-MCNC: 140 MG/DL
VALPROATE SERPL-MCNC: 71.2 UG/ML

## 2024-11-11 PROCEDURE — 80164 ASSAY DIPROPYLACETIC ACD TOT: CPT | Mod: ORL | Performed by: PSYCHIATRY & NEUROLOGY

## 2024-11-11 PROCEDURE — 83036 HEMOGLOBIN GLYCOSYLATED A1C: CPT | Mod: ORL | Performed by: PSYCHIATRY & NEUROLOGY

## 2024-11-11 PROCEDURE — 36415 COLL VENOUS BLD VENIPUNCTURE: CPT | Mod: ORL | Performed by: PSYCHIATRY & NEUROLOGY

## 2024-11-11 PROCEDURE — 80061 LIPID PANEL: CPT | Mod: ORL | Performed by: PSYCHIATRY & NEUROLOGY

## 2024-11-11 PROCEDURE — P9604 ONE-WAY ALLOW PRORATED TRIP: HCPCS | Mod: ORL | Performed by: PSYCHIATRY & NEUROLOGY

## 2024-11-21 ENCOUNTER — HOSPITAL ENCOUNTER (OUTPATIENT)
Dept: MAMMOGRAPHY | Facility: CLINIC | Age: 84
Discharge: HOME | End: 2024-11-21
Attending: FAMILY MEDICINE
Payer: COMMERCIAL

## 2024-11-21 DIAGNOSIS — Z12.31 VISIT FOR SCREENING MAMMOGRAM: ICD-10-CM

## 2024-11-21 PROCEDURE — 77063 BREAST TOMOSYNTHESIS BI: CPT

## 2024-11-21 PROCEDURE — 77067 SCR MAMMO BI INCL CAD: CPT

## 2024-12-03 ENCOUNTER — LAB REQUISITION (OUTPATIENT)
Dept: LAB | Facility: CLINIC | Age: 84
End: 2024-12-03
Payer: COMMERCIAL

## 2024-12-03 DIAGNOSIS — D64.9 ANEMIA, UNSPECIFIED: ICD-10-CM

## 2024-12-04 LAB
ANION GAP SERPL CALCULATED.3IONS-SCNC: 10 MMOL/L (ref 7–15)
BUN SERPL-MCNC: 28.3 MG/DL (ref 8–23)
CALCIUM SERPL-MCNC: 9.1 MG/DL (ref 8.8–10.4)
CHLORIDE SERPL-SCNC: 99 MMOL/L (ref 98–107)
CREAT SERPL-MCNC: 1.07 MG/DL (ref 0.51–0.95)
EGFRCR SERPLBLD CKD-EPI 2021: 51 ML/MIN/1.73M2
ERYTHROCYTE [DISTWIDTH] IN BLOOD BY AUTOMATED COUNT: 13 % (ref 10–15)
GLUCOSE SERPL-MCNC: 102 MG/DL (ref 70–99)
HCO3 SERPL-SCNC: 24 MMOL/L (ref 22–29)
HCT VFR BLD AUTO: 27.6 % (ref 35–47)
HGB BLD-MCNC: 9 G/DL (ref 11.7–15.7)
MCH RBC QN AUTO: 30.1 PG (ref 26.5–33)
MCHC RBC AUTO-ENTMCNC: 32.6 G/DL (ref 31.5–36.5)
MCV RBC AUTO: 92 FL (ref 78–100)
PLATELET # BLD AUTO: 187 10E3/UL (ref 150–450)
POTASSIUM SERPL-SCNC: 4.7 MMOL/L (ref 3.4–5.3)
RBC # BLD AUTO: 2.99 10E6/UL (ref 3.8–5.2)
SODIUM SERPL-SCNC: 133 MMOL/L (ref 135–145)
WBC # BLD AUTO: 5.9 10E3/UL (ref 4–11)

## 2024-12-04 PROCEDURE — 36415 COLL VENOUS BLD VENIPUNCTURE: CPT | Mod: ORL | Performed by: FAMILY MEDICINE

## 2024-12-04 PROCEDURE — 85027 COMPLETE CBC AUTOMATED: CPT | Mod: ORL | Performed by: FAMILY MEDICINE

## 2024-12-04 PROCEDURE — 80048 BASIC METABOLIC PNL TOTAL CA: CPT | Mod: ORL | Performed by: FAMILY MEDICINE

## 2024-12-04 PROCEDURE — P9603 ONE-WAY ALLOW PRORATED MILES: HCPCS | Mod: ORL | Performed by: FAMILY MEDICINE

## 2024-12-22 ENCOUNTER — HEALTH MAINTENANCE LETTER (OUTPATIENT)
Age: 84
End: 2024-12-22

## 2025-02-12 ENCOUNTER — LAB REQUISITION (OUTPATIENT)
Dept: LAB | Facility: CLINIC | Age: 85
End: 2025-02-12
Payer: COMMERCIAL

## 2025-02-12 DIAGNOSIS — I10 ESSENTIAL (PRIMARY) HYPERTENSION: ICD-10-CM

## 2025-02-17 LAB
ANION GAP SERPL CALCULATED.3IONS-SCNC: 12 MMOL/L (ref 7–15)
BUN SERPL-MCNC: 19.1 MG/DL (ref 8–23)
CALCIUM SERPL-MCNC: 10.1 MG/DL (ref 8.8–10.4)
CHLORIDE SERPL-SCNC: 100 MMOL/L (ref 98–107)
CREAT SERPL-MCNC: 0.88 MG/DL (ref 0.51–0.95)
EGFRCR SERPLBLD CKD-EPI 2021: 64 ML/MIN/1.73M2
GLUCOSE SERPL-MCNC: 150 MG/DL (ref 70–99)
HCO3 SERPL-SCNC: 25 MMOL/L (ref 22–29)
POTASSIUM SERPL-SCNC: 4.7 MMOL/L (ref 3.4–5.3)
SODIUM SERPL-SCNC: 137 MMOL/L (ref 135–145)

## 2025-02-17 PROCEDURE — 36415 COLL VENOUS BLD VENIPUNCTURE: CPT | Mod: ORL | Performed by: FAMILY MEDICINE

## 2025-02-17 PROCEDURE — P9604 ONE-WAY ALLOW PRORATED TRIP: HCPCS | Mod: ORL | Performed by: FAMILY MEDICINE

## 2025-02-17 PROCEDURE — 80048 BASIC METABOLIC PNL TOTAL CA: CPT | Mod: ORL | Performed by: FAMILY MEDICINE

## 2025-02-23 ENCOUNTER — HEALTH MAINTENANCE LETTER (OUTPATIENT)
Age: 85
End: 2025-02-23

## 2025-03-07 ENCOUNTER — LAB REQUISITION (OUTPATIENT)
Dept: LAB | Facility: CLINIC | Age: 85
End: 2025-03-07
Payer: COMMERCIAL

## 2025-03-07 DIAGNOSIS — I10 ESSENTIAL (PRIMARY) HYPERTENSION: ICD-10-CM

## 2025-03-11 LAB
ANION GAP SERPL CALCULATED.3IONS-SCNC: 10 MMOL/L (ref 7–15)
BUN SERPL-MCNC: 18.5 MG/DL (ref 8–23)
CALCIUM SERPL-MCNC: 9.4 MG/DL (ref 8.8–10.4)
CHLORIDE SERPL-SCNC: 99 MMOL/L (ref 98–107)
CREAT SERPL-MCNC: 0.85 MG/DL (ref 0.51–0.95)
EGFRCR SERPLBLD CKD-EPI 2021: 67 ML/MIN/1.73M2
GLUCOSE SERPL-MCNC: 96 MG/DL (ref 70–99)
HCO3 SERPL-SCNC: 23 MMOL/L (ref 22–29)
POTASSIUM SERPL-SCNC: 4.3 MMOL/L (ref 3.4–5.3)
SODIUM SERPL-SCNC: 132 MMOL/L (ref 135–145)

## 2025-03-11 PROCEDURE — 36415 COLL VENOUS BLD VENIPUNCTURE: CPT | Mod: ORL | Performed by: FAMILY MEDICINE

## 2025-03-11 PROCEDURE — P9603 ONE-WAY ALLOW PRORATED MILES: HCPCS | Mod: ORL | Performed by: FAMILY MEDICINE

## 2025-03-11 PROCEDURE — 80048 BASIC METABOLIC PNL TOTAL CA: CPT | Mod: ORL | Performed by: FAMILY MEDICINE

## 2025-05-25 ENCOUNTER — HEALTH MAINTENANCE LETTER (OUTPATIENT)
Age: 85
End: 2025-05-25

## 2025-08-20 ENCOUNTER — LAB REQUISITION (OUTPATIENT)
Dept: LAB | Facility: CLINIC | Age: 85
End: 2025-08-20
Payer: COMMERCIAL

## 2025-08-20 DIAGNOSIS — D64.9 ANEMIA, UNSPECIFIED: ICD-10-CM

## 2025-08-20 DIAGNOSIS — E87.1 HYPO-OSMOLALITY AND HYPONATREMIA: ICD-10-CM

## 2025-08-21 LAB
ANION GAP SERPL CALCULATED.3IONS-SCNC: 11 MMOL/L (ref 7–15)
BUN SERPL-MCNC: 36.9 MG/DL (ref 8–23)
CALCIUM SERPL-MCNC: 9.9 MG/DL (ref 8.8–10.4)
CHLORIDE SERPL-SCNC: 103 MMOL/L (ref 98–107)
CREAT SERPL-MCNC: 0.99 MG/DL (ref 0.51–0.95)
EGFRCR SERPLBLD CKD-EPI 2021: 56 ML/MIN/1.73M2
ERYTHROCYTE [DISTWIDTH] IN BLOOD BY AUTOMATED COUNT: 13.5 % (ref 10–15)
GLUCOSE SERPL-MCNC: 125 MG/DL (ref 70–99)
HCO3 SERPL-SCNC: 21 MMOL/L (ref 22–29)
HCT VFR BLD AUTO: 25.3 % (ref 35–47)
HGB BLD-MCNC: 8.3 G/DL (ref 11.7–15.7)
MCH RBC QN AUTO: 28.9 PG (ref 26.5–33)
MCHC RBC AUTO-ENTMCNC: 32.8 G/DL (ref 31.5–36.5)
MCV RBC AUTO: 88.2 FL (ref 78–100)
PLATELET # BLD AUTO: 238 10E3/UL (ref 150–450)
POTASSIUM SERPL-SCNC: 4.9 MMOL/L (ref 3.4–5.3)
RBC # BLD AUTO: 2.87 10E6/UL (ref 3.8–5.2)
SODIUM SERPL-SCNC: 135 MMOL/L (ref 135–145)
WBC # BLD AUTO: 6.89 10E3/UL (ref 4–11)